# Patient Record
Sex: MALE | Race: WHITE | NOT HISPANIC OR LATINO | Employment: FULL TIME | URBAN - METROPOLITAN AREA
[De-identification: names, ages, dates, MRNs, and addresses within clinical notes are randomized per-mention and may not be internally consistent; named-entity substitution may affect disease eponyms.]

---

## 2021-09-10 ENCOUNTER — OFFICE VISIT (OUTPATIENT)
Dept: FAMILY MEDICINE | Facility: CLINIC | Age: 26
End: 2021-09-10
Payer: COMMERCIAL

## 2021-09-10 VITALS
TEMPERATURE: 97.6 F | BODY MASS INDEX: 22.54 KG/M2 | WEIGHT: 114.8 LBS | DIASTOLIC BLOOD PRESSURE: 67 MMHG | HEART RATE: 72 BPM | SYSTOLIC BLOOD PRESSURE: 103 MMHG | HEIGHT: 60 IN | RESPIRATION RATE: 16 BRPM | OXYGEN SATURATION: 98 %

## 2021-09-10 DIAGNOSIS — Z00.00 PREVENTATIVE HEALTH CARE: ICD-10-CM

## 2021-09-10 DIAGNOSIS — D50.9 IRON DEFICIENCY ANEMIA, UNSPECIFIED IRON DEFICIENCY ANEMIA TYPE: ICD-10-CM

## 2021-09-10 DIAGNOSIS — F64.0 GENDER DYSPHORIA IN ADULT: ICD-10-CM

## 2021-09-10 DIAGNOSIS — Z76.89 ENCOUNTER TO ESTABLISH CARE: Primary | ICD-10-CM

## 2021-09-10 PROBLEM — F90.9 ADHD: Status: ACTIVE | Noted: 2019-09-26

## 2021-09-10 PROBLEM — F33.9 MAJOR DEPRESSIVE DISORDER, RECURRENT (H): Status: ACTIVE | Noted: 2020-06-17

## 2021-09-10 PROBLEM — L30.9 ECZEMA: Status: ACTIVE | Noted: 2019-09-26

## 2021-09-10 LAB
ALBUMIN SERPL-MCNC: 3.8 G/DL (ref 3.4–5)
ALP SERPL-CCNC: 66 U/L (ref 40–150)
ALT SERPL W P-5'-P-CCNC: 35 U/L (ref 0–50)
AST SERPL W P-5'-P-CCNC: 20 U/L (ref 0–45)
BILIRUB DIRECT SERPL-MCNC: 0.2 MG/DL (ref 0–0.2)
BILIRUB SERPL-MCNC: 0.8 MG/DL (ref 0.2–1.3)
CHOLEST SERPL-MCNC: 144 MG/DL
FASTING STATUS PATIENT QL REPORTED: YES
FASTING STATUS PATIENT QL REPORTED: YES
FERRITIN SERPL-MCNC: 11 NG/ML (ref 12–150)
GLUCOSE BLD-MCNC: 83 MG/DL (ref 70–99)
HCV AB SERPL QL IA: NONREACTIVE
HDLC SERPL-MCNC: 60 MG/DL
HGB BLD-MCNC: 14.2 G/DL (ref 11.7–15.7)
IRON SATN MFR SERPL: 61 % (ref 15–46)
IRON SERPL-MCNC: 244 UG/DL (ref 35–180)
LDLC SERPL CALC-MCNC: 71 MG/DL
NONHDLC SERPL-MCNC: 84 MG/DL
PROT SERPL-MCNC: 7.6 G/DL (ref 6.8–8.8)
TIBC SERPL-MCNC: 401 UG/DL (ref 240–430)
TRANSFERRIN SERPL-MCNC: 292 MG/DL (ref 210–360)
TRIGL SERPL-MCNC: 67 MG/DL
VIT B12 SERPL-MCNC: 817 PG/ML (ref 193–986)

## 2021-09-10 PROCEDURE — 80061 LIPID PANEL: CPT | Performed by: STUDENT IN AN ORGANIZED HEALTH CARE EDUCATION/TRAINING PROGRAM

## 2021-09-10 PROCEDURE — 80076 HEPATIC FUNCTION PANEL: CPT | Performed by: STUDENT IN AN ORGANIZED HEALTH CARE EDUCATION/TRAINING PROGRAM

## 2021-09-10 PROCEDURE — 86803 HEPATITIS C AB TEST: CPT | Performed by: STUDENT IN AN ORGANIZED HEALTH CARE EDUCATION/TRAINING PROGRAM

## 2021-09-10 PROCEDURE — 85018 HEMOGLOBIN: CPT | Performed by: STUDENT IN AN ORGANIZED HEALTH CARE EDUCATION/TRAINING PROGRAM

## 2021-09-10 PROCEDURE — 84403 ASSAY OF TOTAL TESTOSTERONE: CPT | Performed by: STUDENT IN AN ORGANIZED HEALTH CARE EDUCATION/TRAINING PROGRAM

## 2021-09-10 PROCEDURE — 82607 VITAMIN B-12: CPT | Performed by: STUDENT IN AN ORGANIZED HEALTH CARE EDUCATION/TRAINING PROGRAM

## 2021-09-10 PROCEDURE — 82947 ASSAY GLUCOSE BLOOD QUANT: CPT | Performed by: STUDENT IN AN ORGANIZED HEALTH CARE EDUCATION/TRAINING PROGRAM

## 2021-09-10 PROCEDURE — 36415 COLL VENOUS BLD VENIPUNCTURE: CPT | Performed by: STUDENT IN AN ORGANIZED HEALTH CARE EDUCATION/TRAINING PROGRAM

## 2021-09-10 PROCEDURE — 99204 OFFICE O/P NEW MOD 45 MIN: CPT | Mod: GC | Performed by: STUDENT IN AN ORGANIZED HEALTH CARE EDUCATION/TRAINING PROGRAM

## 2021-09-10 PROCEDURE — 84466 ASSAY OF TRANSFERRIN: CPT | Performed by: STUDENT IN AN ORGANIZED HEALTH CARE EDUCATION/TRAINING PROGRAM

## 2021-09-10 PROCEDURE — 82728 ASSAY OF FERRITIN: CPT | Performed by: STUDENT IN AN ORGANIZED HEALTH CARE EDUCATION/TRAINING PROGRAM

## 2021-09-10 RX ORDER — CLOBETASOL PROPIONATE 0.5 MG/G
CREAM TOPICAL
COMMUNITY
Start: 2020-02-11 | End: 2022-05-13

## 2021-09-10 RX ORDER — TRIAMCINOLONE ACETONIDE 0.25 MG/G
OINTMENT TOPICAL
COMMUNITY
Start: 2019-11-19 | End: 2023-01-12

## 2021-09-10 RX ORDER — BUPROPION HYDROCHLORIDE 300 MG/1
300 TABLET ORAL
COMMUNITY
Start: 2020-08-20 | End: 2024-04-19

## 2021-09-10 RX ORDER — METHYLPHENIDATE HYDROCHLORIDE 10 MG/1
5 TABLET ORAL
COMMUNITY
Start: 2020-08-20 | End: 2023-01-12

## 2021-09-10 RX ORDER — METHYLPHENIDATE HYDROCHLORIDE 27 MG/1
27 TABLET, EXTENDED RELEASE ORAL
COMMUNITY
Start: 2020-08-20 | End: 2023-01-12

## 2021-09-10 ASSESSMENT — MIFFLIN-ST. JEOR: SCORE: 1195.98

## 2021-09-10 NOTE — PATIENT INSTRUCTIONS
Patient Education   Here is the plan from today's visit    1. Encounter to establish care  2. Preventative health care  3. Gender dysphoria in adult  Return in 1 week to begin transfer of testosterone therapy.     Eczema: Aquaphor or Eucerin cream daily, best time is right after shower. Any thick cream in a tub.     Acne: Will revisit next visit.  Bring what you're using and we'll go from there.     Please call or return to clinic if your symptoms don't go away.    Follow up plan  Return in about 1 week (around 9/17/2021) for Transition hormone care here.    Thank you for coming to [facility] today.  COVID-19 Vaccine:  [facility] has walk-in appointments for COVID-19 vaccines. No appointment needed!   You also have the option of receiving Moderna vaccine during your physician appointment. Please ask your care team for more information!  Lab Testing:  **If you had lab testing today and your results are reassuring or normal they will be mailed to you or sent through [employer] within 7 days.   **If the lab tests need quick action we will call you with the results.  **If you are having labs done on a different day, please call [phone] to schedule at [facility] or [phone] for other [facility] locations.   The phone number we will call with results is # [phone] (home) . If this is not the best number please call our clinic and change the number.  Medication Refills:  If you need any refills please call your pharmacy and they will contact us.   If you need to  your refill at a new pharmacy, please contact the new pharmacy directly. The new pharmacy will help you get your medications transferred faster.   Scheduling:  If you have any concerns about today's visit or wish to schedule another appointment please call our office during normal business hours [phone] (8-5:00 M-F)  If a referral was made to a [facility] and you don't get a call  from central scheduling please call [phone].  If a Mammogram was ordered for you at [facility] call [phone] to schedule or change your appointment.  If you had an EKG/XRay/CT/Ultrasound/MRI ordered the number is [phone] to schedule or change your radiology appointment.   Medical Concerns:  If you have urgent medical concerns please call [phone] at any time of the day.    Blossom Ghosh, DO

## 2021-09-10 NOTE — PROGRESS NOTES
"  Assessment & Plan     Encounter to establish care  Patient has been searching for an affirming clinic to centralize his care, and had heard good things about \[facility]\"".  We will plan on eventually transitioning all of his medications and care here.     Preventative health care  Hep C screening ordered, HIV screening updated.    Gender dysphoria in adult  Would like to transition his testosterone therapy to \[facility]\"".  Ordered liver function panel, glucose, hemoglobin, lipids, and total testosterone.  Patient will follow up with us in a week to transition that care here. Did not administer Sundays shot, so testosterone levels will likely be out of desired range.  Requested referral for top surgery, would like it sooner rather than later.   - Comprehensive Gender Care referral  - Gender Labs  - Return visit in 1 week    Iron Deficiency Anemia  Iron panel ordered today, encouraged patient to take iron supplement daily and use laxatives or stool softeners as needed.  When labs return, encouraged him to consider using cast iron pans, or a charanjit iron fish in his cooking.          Return in about 1 week (around 9/17/2021) for Transition hormone care here.    Blossom Ghosh [facility] is a 26 year old who presents to establish care at \[facility]\"". [place] about us as a trans clinic, wants to establish care and consolidate his cares.    Iron Deficiency: Diagnosed in 6th grade. \"Always tired.\" Takes iron occasionally. Not consistently. Iron tends to give him constipation, so he is looking for a gentler form. Current one is a slow release + vit D + stool softener. Take it every other day, working so far.     Eczema: Diagnosed in high school. Flares up occasionally. More when stressed. Prescription cream then, goes away after two weeks.     ADHD, Anxiety/Depression: Well controlled on current medications.     Sexual History: No current partners. Prefers any genders. Uses condoms and " "depo in the past. Thinking about the copper IUD. Progesterone only IUD may be an option. Both semesters in college after Depo, failed classes. May not be the best option.     Gender: Thinking about top surgery down the road, but hasn't started the process. Would like a referral. [facility]  Is happy on his current dosage, but does forget to take often.  Did not administer Sundays shot, so testosterone levels will likely be out of desired range.    Review of Systems   Headaches 1-2 times a week, takes ibuprofen and it helps. Stress induced, dehydration. Works outside (nature restoration).   Has tinnitus, sleeps with a fan on. Has trouble hearing people sometimes.       Constitutional, neuro, ENT, endocrine, pulmonary, cardiac, gastrointestinal, genitourinary, musculoskeletal, integument and psychiatric systems are negative, except as otherwise noted.          Objective    /67   Pulse 72   Temp 97.6  F (36.4  C) (Oral)   Resp 16   Ht 1.53 m (5' 0.24\")   Wt 52.1 kg (114 lb 12.8 oz)   LMP  (LMP Unknown)   SpO2 98%   BMI 22.24 kg/m    Body mass index is 22.24 kg/m .  Physical Exam   GENERAL: healthy, alert and no distress  EYES: Eyes grossly normal to inspection, PERRL and conjunctivae and sclerae normal  NECK: no adenopathy, no asymmetry, masses, or scars and thyroid normal to palpation  RESP: lungs clear to auscultation - no rales, rhonchi or wheezes  CV: regular rate and rhythm, normal S1 S2, no S3 or S4, no murmur, click or rub, no peripheral edema and peripheral pulses strong  ABDOMEN: soft, nontender, no hepatosplenomegaly, no masses and bowel sounds normal  MS: no gross musculoskeletal defects noted, no edema  SKIN: no suspicious lesions or rashes.  No current eczema flare, does have scattered mild acne on cheeks and nose.  PSYCH: mentation appears normal, affect normal/bright            "

## 2021-09-14 ENCOUNTER — TELEPHONE (OUTPATIENT)
Dept: FAMILY MEDICINE | Facility: CLINIC | Age: 26
End: 2021-09-14

## 2021-09-14 LAB — TESTOST SERPL-MCNC: 77 NG/DL (ref 8–60)

## 2021-09-14 NOTE — TELEPHONE ENCOUNTER
"RN left message with clinic callback number. If patient calls back, please transfer to any available RN.     Darlyn Sosa, RN    \"Duglas,  As you can see from your results, you were right! Your ferritin is low. I'd recommend continuing your iron supplement, ferrous sulfate 325 every day. As you know, this can cause some people some constipation, so be on the lookout for that and eat plenty of high fiber foods!     I'd also recommend starting Miralax every day, following the instructions on the bottle, then upping your amount until it works for you (within the bottle's range of course!)     Thanks,   Blossom Augie, DO  Pronouns: she/her/hers\"  "

## 2021-09-17 ENCOUNTER — OFFICE VISIT (OUTPATIENT)
Dept: FAMILY MEDICINE | Facility: CLINIC | Age: 26
End: 2021-09-17
Payer: COMMERCIAL

## 2021-09-17 VITALS
HEART RATE: 68 BPM | RESPIRATION RATE: 16 BRPM | WEIGHT: 113.4 LBS | SYSTOLIC BLOOD PRESSURE: 116 MMHG | BODY MASS INDEX: 21.97 KG/M2 | OXYGEN SATURATION: 99 % | DIASTOLIC BLOOD PRESSURE: 71 MMHG

## 2021-09-17 DIAGNOSIS — D50.9 IRON DEFICIENCY ANEMIA, UNSPECIFIED IRON DEFICIENCY ANEMIA TYPE: ICD-10-CM

## 2021-09-17 DIAGNOSIS — L70.9 ACNE, UNSPECIFIED ACNE TYPE: ICD-10-CM

## 2021-09-17 DIAGNOSIS — F64.0 GENDER DYSPHORIA IN ADULT: Primary | ICD-10-CM

## 2021-09-17 DIAGNOSIS — H91.93 DECREASED HEARING OF BOTH EARS: ICD-10-CM

## 2021-09-17 DIAGNOSIS — H93.13 TINNITUS, BILATERAL: ICD-10-CM

## 2021-09-17 PROCEDURE — 99214 OFFICE O/P EST MOD 30 MIN: CPT | Performed by: STUDENT IN AN ORGANIZED HEALTH CARE EDUCATION/TRAINING PROGRAM

## 2021-09-17 ASSESSMENT — PATIENT HEALTH QUESTIONNAIRE - PHQ9
5. POOR APPETITE OR OVEREATING: SEVERAL DAYS
SUM OF ALL RESPONSES TO PHQ QUESTIONS 1-9: 11

## 2021-09-17 ASSESSMENT — ANXIETY QUESTIONNAIRES
6. BECOMING EASILY ANNOYED OR IRRITABLE: MORE THAN HALF THE DAYS
3. WORRYING TOO MUCH ABOUT DIFFERENT THINGS: MORE THAN HALF THE DAYS
IF YOU CHECKED OFF ANY PROBLEMS ON THIS QUESTIONNAIRE, HOW DIFFICULT HAVE THESE PROBLEMS MADE IT FOR YOU TO DO YOUR WORK, TAKE CARE OF THINGS AT HOME, OR GET ALONG WITH OTHER PEOPLE: VERY DIFFICULT
1. FEELING NERVOUS, ANXIOUS, OR ON EDGE: NEARLY EVERY DAY
5. BEING SO RESTLESS THAT IT IS HARD TO SIT STILL: MORE THAN HALF THE DAYS
7. FEELING AFRAID AS IF SOMETHING AWFUL MIGHT HAPPEN: SEVERAL DAYS

## 2021-09-17 NOTE — PROGRESS NOTES
"    Assessment & Plan     Duglas was seen today for recheck medication.    Diagnoses and all orders for this visit:    Gender dysphoria in adult  -     needle, disp, 18G X 1\" MISC; Use to draw up hormones once weekly  -     syringe, disposable, 1 ML MISC; Use once weekly to draw up hormones  -     Needle, Disp, (B-D HYPODERMIC NEEDLE) 25G X 1\" MISC; Use to inject medication IM  Patient needing refills today of supplies.  Does not need medications at this time, but is transitioning hormone care to here.  Shot days are on Saturdays.    Iron deficiency anemia, unspecified iron deficiency anemia type  Reviewed labs, ferratin, transferrin, Vit B, CBC. Continue current plan    Acne, unspecified acne type  Discussed today's gentle skin care as well as provided recommendation for over-the-counter medications.  Patient will continue Differin and did not elect for higher strength benzyl peroxide today.  Will follow-up in a few weeks with PCP.    Decreased hearing of both ears  Tinnitus, bilateral  -     Adult Audiology Referral; Future  Chronic disease with acute exacerbation with unknown significance and prognosis.  Will refer to audiology for further work-up.    30 minutes spent on the date of the encounter doing chart review, history and exam, documentation and further activities per the note    Return in about 4 weeks (around 10/15/2021).    Rubén De Leon DO  [facility] RACHELLE Ardon is a 26 year old who presents for the following health issues     HPI     [facility]    Skin care- general acne. Always had a baseline of acne. Been around for some time. Using Differin. Does treat worst stuff, but not great for all. Has tried Neutrogena skin care. On bilateral cheeks. Occasional cysts.     HRT- Shot day is Sunday.     Tinnnitis- Years. At least 6. Both ears.          Objective    /71   Pulse 68   Resp 16   Wt 51.4 kg (113 lb 6.4 oz)   LMP  (LMP Unknown)   " SpO2 99%   BMI 21.97 kg/m    Body mass index is 21.97 kg/m .  Physical Exam   General: Alert and oriented, in no acute distress.  Skin: Scattered closed comedones across face  Eyes: Extra-ocular muscles grossly intact, pupils equal.  CV: No cyanosis or pallor, warm and well perfused.  Respiratory: No respiratory distress, no accessory muscle use.  Neuro: Gait and station normal, comprehension intact. Gross and fine motor skills intact.   Psychiatric: Mood and affect appear normal.   Extremities: Warm, able to move all four extremities at will.

## 2021-09-17 NOTE — PATIENT INSTRUCTIONS
Discussed acne management and self cares (see attached AVS for instructions given to patient about self cares). Educated about disease of pilosebaceous unit and how to prevent as well as treat.      -no picking/popping/face touching!  -Wash your pillowcases weekly  -Freshly cleaned towel daily   -Take a picture now - compare in 1 and 2 and 3 months.   -f/u 4-6 weeks    Medications:   Face wash, gentle, preferably with salicylic acid  Benzoyl peroxide 10%  Moisturizer with SPF 15-20  Retinoid gel -many options depending on insurance, Retin-A, adapdalene, etc.  Antibiotic gel - many options like duac, depending on insurance  Minocycline or Doxycycline: oral antibiotic pills, once or twice per day    Example face treatment schedule:  AM-   1-Wash hands first, then face with your salicylic acid wash or mild soap such as Dove, Cetaphil, or other, but must be without fragrance.  2-Pat dry with a clean towel, do not scrub/rub.  3-Use spot treatment of benzoyl peroxide for particularly bad lesions.  4-Apply moisturizer to entire face - noncomedogenic. Goal of 15-20 SPF. Other commonly used brands include Cetaphil or Cerave or Neutrogena.Make sure to protect your face from sun when skiing/out in snow or in summer sun.    PM-   1- Wash hands first, then face, pat dry.  2- Apply retinoid gel in a thin film over your whole face- this may make the acne appear worse and face more red - after a couple weeks it will begin to improve and then will begin to prevent further breakouts.  3- Apply duac (clindamycin antibiotic + benzoyl peroxide) to your whole face

## 2021-09-21 NOTE — TELEPHONE ENCOUNTER
Patient was seen by Dr. De Leon on 9/17/21- appears that labs were discussed but unclear if medication recommendations were. RN sending to provider to advise if additional follow up call needed.   Alma Andrew, RN

## 2021-09-21 NOTE — TELEPHONE ENCOUNTER
Per Dr. De Leon this was discussed at office visit. No further action needed at this time.   Alma Andrew, RN        Rubén De Leon, DO  You 9 minutes ago (10:55 AM)         I mentioned both and reiterated the plan from Dr. Ghosh.   Both are OTC meds, so if they need Rx would route to Dr. Ghosh.     Message text

## 2021-11-11 NOTE — PROGRESS NOTES
AUDIOLOGY REPORT    SUBJECTIVE:  Duglas Hurtado is a 26 year old adult who was seen in the Audiology Clinic at the [facility] for audiologic evaluation, referred by Rubén De Leon D.O.  The patient reports tinnitus for several years. Also reports sensitivity to loud sounds. Denies pain,drainage, dizziness and hearing loss. Has a family history of hearing loss iwht age.     OBJECTIVE:  Abuse Screening:  Do you feel unsafe at home or work/school? No  Do you feel threatened by someone? No  Does anyone try to keep you from having contact with others, or doing things outside of your home? No  Physical signs of abuse present? No     Fall Risk Screen:  1. Have you fallen two or more times in the past year? No  2. Have you fallen and had an injury in the past year? No    Otoscopic exam indicates ears are clear of cerumen bilaterally     Pure Tone Thresholds assessed using conventional audiometry with good  reliability from 250-8000 Hz bilaterally using insert earphones and circumaural headphones     RIGHT:  normal hearing sensitivity    LEFT:   Normal hearing sensativity    Tympanogram:    RIGHT: normal eardrum mobility    LEFT:   normal eardrum mobility    Reflexes (reported by stimulus ear):  RIGHT: Ipsilateral is present at normal levels  RIGHT: Contralateral is elevated  LEFT:   Ipsilateral is present at normal levels  LEFT:   Contralateral is present at normal levels      Speech Reception Threshold:    RIGHT: 5 dB HL    LEFT:   5 dB HL  Word Recognition Score:     RIGHT: 100% at 50 dB HL using NU-6 recorded word list.    LEFT:   100% at 50 dB HL using NU-6 recorded word list.      ASSESSMENT:  Hearing is within normal limits in both ears today. Today s results were discussed with the patient in detail.     PLAN:  Patient was counseled regarding tinnitus including reducing caffeine, alcohol, nicotine, and a low sodium diet as it apply's to the patient.It is recommended that  the patient return if changes occur.  Please call this clinic with questions regarding these results or recommendations.        Samra Car, [facility]  Licensed Audiologist  [place] #[id number]

## 2021-11-12 ENCOUNTER — OFFICE VISIT (OUTPATIENT)
Dept: AUDIOLOGY | Facility: CLINIC | Age: 26
End: 2021-11-12
Attending: STUDENT IN AN ORGANIZED HEALTH CARE EDUCATION/TRAINING PROGRAM
Payer: COMMERCIAL

## 2021-11-12 DIAGNOSIS — H91.93 DECREASED HEARING OF BOTH EARS: ICD-10-CM

## 2021-11-12 DIAGNOSIS — H93.13 TINNITUS, BILATERAL: ICD-10-CM

## 2021-11-12 PROCEDURE — 92550 TYMPANOMETRY & REFLEX THRESH: CPT | Performed by: AUDIOLOGIST

## 2021-11-12 PROCEDURE — 92557 COMPREHENSIVE HEARING TEST: CPT | Performed by: AUDIOLOGIST

## 2021-12-09 ENCOUNTER — TELEPHONE (OUTPATIENT)
Dept: FAMILY MEDICINE | Facility: CLINIC | Age: 26
End: 2021-12-09
Payer: COMMERCIAL

## 2021-12-09 NOTE — TELEPHONE ENCOUNTER
Prior Authorization Approval    Authorization Effective Date: 11/9/2021  Authorization Expiration Date: 12/9/2022  Medication: Testosterone -APPROVED  Approved Dose/Quantity:   Reference #:     Insurance Company: [employer] - Phone [phone] Fax [phone]  Expected CoPay:       CoPay Card Available:      Foundation Assistance Needed:    Which Pharmacy is filling the prescription (Not needed for infusion/clinic administered): [id number] - [place], [place] - [address] [place] [address]  Pharmacy Notified: Yes  Patient Notified: No    Pharmacy will notify patient when medication is ready.

## 2021-12-09 NOTE — TELEPHONE ENCOUNTER
Prior Authorization Retail Medication Request    Medication/Dose: Testosterone   ICD code Gender dysphoria in adult [F64.0]  - Primary   Insurance Name:  [employer]   Insurance ID:      [id number]

## 2021-12-09 NOTE — TELEPHONE ENCOUNTER
Central Prior Authorization Team   Phone: [phone]      PA Initiation    Medication: Testosterone -Initiated  Insurance Company: [employer] - Phone [phone] Fax [phone]  Pharmacy Filling the Rx: [id number] [address]  Filling Pharmacy Phone: [phone]  Filling Pharmacy Fax:    [place] Date: 12/9/2021

## 2021-12-18 ENCOUNTER — HEALTH MAINTENANCE LETTER (OUTPATIENT)
Age: 26
End: 2021-12-18

## 2022-01-31 DIAGNOSIS — F64.0 GENDER DYSPHORIA IN ADULT: ICD-10-CM

## 2022-01-31 RX ORDER — CETIRIZINE HYDROCHLORIDE 10 MG/1
TABLET ORAL
COMMUNITY
Start: 2022-01-05 | End: 2022-05-13

## 2022-01-31 RX ORDER — TESTOSTERONE CYPIONATE 200 MG/ML
60 INJECTION, SOLUTION INTRAMUSCULAR WEEKLY
Qty: 4 ML | Refills: 5 | Status: SHIPPED | OUTPATIENT
Start: 2022-01-31 | End: 2022-04-14

## 2022-01-31 RX ORDER — METHYLPHENIDATE HYDROCHLORIDE 18 MG/1
TABLET, EXTENDED RELEASE ORAL
COMMUNITY
Start: 2022-01-11 | End: 2023-10-27

## 2022-01-31 NOTE — TELEPHONE ENCOUNTER
"Request for medication refill:  testosterone cypionate (DEPOTESTOSTERONE) 200 MG/ML injection  Providers if patient needs an appointment and you are willing to give a one month supply please refill for one month and  send a letter/MyChart using \".SMILLIMITEDREFILL\" .smillimited and route chart to \"P SMI \" (Giving one month refill in non controlled medications is strongly recommended before denial)    If refill has been denied, meaning absolutely no refills without visit, please complete the smart phrase \".smirxrefuse\" and route it to the \"P SMI MED REFILLS\"  pool to inform the patient and the pharmacy.    Kayla Guido        "

## 2022-04-12 DIAGNOSIS — F64.0 GENDER DYSPHORIA IN ADULT: ICD-10-CM

## 2022-04-12 NOTE — TELEPHONE ENCOUNTER
"Request for medication refill:  Testosterone Cypionate    Providers if patient needs an appointment and you are willing to give a one month supply please refill for one month and  send a letter/MyChart using \".SMILLIMITEDREFILL\" .smillimited and route chart to \"P SMI \" (Giving one month refill in non controlled medications is strongly recommended before denial)    If refill has been denied, meaning absolutely no refills without visit, please complete the smart phrase \".smirxrefuse\" and route it to the \"P SMI MED REFILLS\"  pool to inform the patient and the pharmacy.    Nimisha Palmer        "

## 2022-04-12 NOTE — TELEPHONE ENCOUNTER
Patient last seen 9/17/21 by Dr. De Leon for gender care. Recommended to follow up in 4 weeks. Last HRT labs significant for total testosterone 77 (less than goal). Lipids, Hgb, glucose, and LFTs within normal limits. No follow ups currently scheduled. Recommending patient to be seen in clinic soon for follow up of HRT.

## 2022-04-14 DIAGNOSIS — F64.0 GENDER DYSPHORIA IN ADULT: ICD-10-CM

## 2022-04-14 RX ORDER — TESTOSTERONE CYPIONATE 200 MG/ML
60 INJECTION, SOLUTION INTRAMUSCULAR WEEKLY
Qty: 4 ML | Refills: 5 | Status: SHIPPED | OUTPATIENT
Start: 2022-04-14 | End: 2022-12-30

## 2022-05-12 NOTE — PROGRESS NOTES
"      Assessment & Plan     Patient is a 27-year-old individual on HRT who presents today for HRT follow-up, and also has multiple new concerns they wish to discuss today.    Gender dysphoria in adult  Regarding gender dysphoria, feels stable on current dose of testosterone.  Requires new needles.  Given instructions today on locations for intramuscular injections as patient has been injecting quite distally.  Laboratory testing is ordered today as well, to be completed between now and September of this year for annual labs.   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  For work-up, UA is completed today and returned without blood, which is reassuring.  No further work-up at this time.  - UA reflex to Microscopic and Culture    Acne vulgaris  Patient has concern for acne vulgaris plus or minus rosacea.  We were not able to discuss in great deal detail today, however, patient has been using over-the-counter adapalene.  A referral to dermatology is given given patient's multiple skin concerns today.  - Adult Dermatology Referral    Decreased ROM of neck  Incidentally, patient is noting some mild decreased range of motion when turning towards the left.  On exam, this is only mildly decreased.  Offered physical therapy as first-line treatment for this patient, and a referral was placed today.  - Physical Therapy Referral    Recurrent major depressive disorder, in partial remission (H)  Patient has a history of major depression.  PHQ-9 and DORA-7 are completed today and appear to be stable.         No follow-ups on file.    Selin Waddell MD  [facility]    Subjective   Chief Complaint   Patient presents with     Recheck Medication     HRT F/U       CRISTINO Ardon is a " "27 year old individual that uses pronouns He/Him/His/Himself that presents today for follow up of:  masculinizing hormone therapy.     Blood in urine on DOT exam    Acne  Adapalene   neutrogina moisturizer    On hormones?  YES +++ Shot day of the week? Sunday      Due for labs?  No      +++ Refills of meds needed?  No      DORA-7 SCORE 5/13/2022   Total Score 6       PHQ 9/17/2021 5/13/2022   PHQ-9 Total Score 11 10   Q9: Thoughts of better off dead/self-harm past 2 weeks Not at all Not at all     ---    Past Surgical History:   Procedure Laterality Date     STICHES/SUTURES CARE  2001    on forhead from falling [place] the [place]     ZZC DRAINAGE LYMPH NODE,SIMPLE  1998    Unsure of procedure, [facility]       Patient Active Problem List   Diagnosis     Iron deficiency anemia     Abnormal EEG     Vitamin D insufficiency     Abdominal pain, generalized     Other nonspecific finding on examination of urine     Major depressive disorder, recurrent (H)     Eczema     ADHD       Current Outpatient Medications   Medication Sig Dispense Refill     buPROPion (WELLBUTRIN XL) 300 MG 24 hr tablet Take 300 mg by mouth       CONCERTA 18 MG CR tablet TAKE 1 TABLET BY MOUTH ONE TIME DAILY       FERROUS SULFATE 325 (65 FE) MG PO TABS Take 1 tab twice daily (Patient taking differently: No sig reported) 120 Tab 3     methylphenidate (RITALIN) 10 MG tablet Take 5 mg by mouth       Needle, Disp, (B-D HYPODERMIC NEEDLE) 25G X 1\" MISC Use to inject medication IM 25 each 3     needle, disp, 18G X 1\" MISC Use to draw up hormones once weekly 25 each 3     syringe, disposable, 1 ML MISC Use once weekly to draw up hormones 25 each 3     testosterone cypionate (DEPOTESTOSTERONE) 200 MG/ML injection Inject 0.3 mLs (60 mg) into the muscle once a week 4 mL 5     Methylphenidate HCl (METHYLPHENIDATE ER) 27 MG 24H tablet Take 27 mg by mouth (Patient not taking: Reported on 5/13/2022)       needle, disp, 18G X 1\" MISC Use to draw up " "hormones once weekly (Patient not taking: Reported on 5/13/2022) 25 each 3     Needle, Disp, 25G X 1-1/2\" MISC Use once weekly for administering hormone IM (Patient not taking: Reported on 5/13/2022) 25 each 3     syringe, disposable, 1 ML MISC Use once weekly to draw up hormones (Patient not taking: Reported on 5/13/2022) 25 each 3     triamcinolone (KENALOG) 0.025 % external ointment  (Patient not taking: Reported on 5/13/2022)       VITAMIN D, CHOLECALCIFEROL, PO Take 1,000 Units by mouth daily (Patient not taking: Reported on 5/13/2022)         History   Smoking Status     Passive Smoke Exposure - Never Smoker   Smokeless Tobacco     Never Used     Comment: mom smokes         Review of Systems   Constitutional, HEENT, cardiovascular, pulmonary, gi and gu systems are negative, except as otherwise noted.      Objective    /77   Pulse 110   Temp 98.2  F (36.8  C) (Oral)   Resp 16   Wt 54 kg (119 lb)   SpO2 96%   Breastfeeding No   BMI 23.06 kg/m    Body mass index is 23.06 kg/m .  Physical Exam  Constitutional:       Appearance: Normal appearance.   HENT:      Head: Normocephalic.   Eyes:      General: No scleral icterus.     Extraocular Movements: Extraocular movements intact.      Conjunctiva/sclera: Conjunctivae normal.   Neck:      Comments: Minimal decreased ROM when turning to the left.   Cardiovascular:      Rate and Rhythm: Normal rate and regular rhythm.      Heart sounds: Normal heart sounds.   Pulmonary:      Effort: Pulmonary effort is normal.      Breath sounds: Normal breath sounds.   Musculoskeletal:         General: Normal range of motion.   Neurological:      General: No focal deficit present.      Mental Status: He is alert and oriented to person, place, and time.           Results from this visit  Results for orders placed or performed in visit on 05/13/22   UA reflex to Microscopic and Culture     Status: Normal    Specimen: Urine, Midstream   Result Value Ref Range    Color Urine " Yellow Colorless, Straw, Light Yellow, Yellow    Appearance Urine Clear Clear    Glucose Urine Negative Negative mg/dL    Bilirubin Urine Negative Negative    Ketones Urine Negative Negative mg/dL    Specific Gravity Urine 1.020 1.005 - 1.030    Blood Urine Negative Negative    pH Urine 6.5 5.0 - 8.0    Protein Albumin Urine Negative Negative mg/dL    Urobilinogen Urine 0.2 0.2, 1.0 E.U./dL    Nitrite Urine Negative Negative    Leukocyte Esterase Urine Negative Negative    Narrative    Microscopic not indicated       This office note has been dictated.

## 2022-05-13 ENCOUNTER — OFFICE VISIT (OUTPATIENT)
Dept: FAMILY MEDICINE | Facility: CLINIC | Age: 27
End: 2022-05-13
Payer: COMMERCIAL

## 2022-05-13 VITALS
TEMPERATURE: 98.2 F | BODY MASS INDEX: 23.06 KG/M2 | DIASTOLIC BLOOD PRESSURE: 77 MMHG | HEART RATE: 110 BPM | SYSTOLIC BLOOD PRESSURE: 110 MMHG | WEIGHT: 119 LBS | OXYGEN SATURATION: 96 % | RESPIRATION RATE: 16 BRPM

## 2022-05-13 DIAGNOSIS — R29.898 DECREASED ROM OF NECK: ICD-10-CM

## 2022-05-13 DIAGNOSIS — R31.9 HEMATURIA, UNSPECIFIED TYPE: ICD-10-CM

## 2022-05-13 DIAGNOSIS — F64.0 GENDER DYSPHORIA IN ADULT: Primary | ICD-10-CM

## 2022-05-13 DIAGNOSIS — L70.0 ACNE VULGARIS: ICD-10-CM

## 2022-05-13 DIAGNOSIS — F33.41 RECURRENT MAJOR DEPRESSIVE DISORDER, IN PARTIAL REMISSION (H): ICD-10-CM

## 2022-05-13 LAB
ALBUMIN UR-MCNC: NEGATIVE MG/DL
APPEARANCE UR: CLEAR
BILIRUB UR QL STRIP: NEGATIVE
COLOR UR AUTO: YELLOW
GLUCOSE UR STRIP-MCNC: NEGATIVE MG/DL
HGB UR QL STRIP: NEGATIVE
KETONES UR STRIP-MCNC: NEGATIVE MG/DL
LEUKOCYTE ESTERASE UR QL STRIP: NEGATIVE
NITRATE UR QL: NEGATIVE
PH UR STRIP: 6.5 [PH] (ref 5–8)
SP GR UR STRIP: 1.02 (ref 1–1.03)
UROBILINOGEN UR STRIP-ACNC: 0.2 E.U./DL

## 2022-05-13 PROCEDURE — 99214 OFFICE O/P EST MOD 30 MIN: CPT | Mod: GC | Performed by: STUDENT IN AN ORGANIZED HEALTH CARE EDUCATION/TRAINING PROGRAM

## 2022-05-13 PROCEDURE — 81003 URINALYSIS AUTO W/O SCOPE: CPT | Performed by: STUDENT IN AN ORGANIZED HEALTH CARE EDUCATION/TRAINING PROGRAM

## 2022-05-13 ASSESSMENT — ANXIETY QUESTIONNAIRES
7. FEELING AFRAID AS IF SOMETHING AWFUL MIGHT HAPPEN: SEVERAL DAYS
IF YOU CHECKED OFF ANY PROBLEMS ON THIS QUESTIONNAIRE, HOW DIFFICULT HAVE THESE PROBLEMS MADE IT FOR YOU TO DO YOUR WORK, TAKE CARE OF THINGS AT HOME, OR GET ALONG WITH OTHER PEOPLE: SOMEWHAT DIFFICULT
1. FEELING NERVOUS, ANXIOUS, OR ON EDGE: MORE THAN HALF THE DAYS
2. NOT BEING ABLE TO STOP OR CONTROL WORRYING: SEVERAL DAYS
GAD7 TOTAL SCORE: 6
6. BECOMING EASILY ANNOYED OR IRRITABLE: NOT AT ALL
3. WORRYING TOO MUCH ABOUT DIFFERENT THINGS: SEVERAL DAYS
5. BEING SO RESTLESS THAT IT IS HARD TO SIT STILL: NOT AT ALL

## 2022-05-13 ASSESSMENT — PATIENT HEALTH QUESTIONNAIRE - PHQ9
SUM OF ALL RESPONSES TO PHQ QUESTIONS 1-9: 10
5. POOR APPETITE OR OVEREATING: SEVERAL DAYS

## 2022-05-13 NOTE — RESULT ENCOUNTER NOTE
Dear Duglas  Here are your labs. There was no blood seen today. No further follow up needed on this issue unless you start to notice new urinary symptoms or see blood yourself.     Selin Waddell MD

## 2022-05-13 NOTE — PATIENT INSTRUCTIONS
"Patient Education   Here is the plan from today's visit    1. Gender dysphoria in adult  - Needle, Disp, (B-D HYPODERMIC NEEDLE) 25G X 1\" MISC; Use to inject medication IM  Dispense: 25 each; Refill: 3  - CBC with platelets; Future  - Comprehensive metabolic panel; Future  - Testosterone total; Future  - Lipid Profile; Future    2. Hematuria, unspecified type  - UA reflex to Microscopic and Culture; Future    3. Acne vulgaris  - Adult Dermatology Referral; Future    4. Decreased ROM of neck  - Physical Therapy Referral; Future    5. Recurrent major depressive disorder, in partial remission (H)      Please call or return to clinic if your symptoms don't go away.    Follow up plan  No follow-ups on file.    Thank you for coming to [facility] today.  Lab Testing:  **If you had lab testing today and your results are reassuring or normal they will be mailed to you or sent through [employer] within 7 days.   **If the lab tests need quick action we will call you with the results.  **If you are having labs done on a different day, please call [phone] to schedule at [facility] or [phone] for other [facility] locations. Labs do not offer walk-in appointments.  The phone number we will call with results is # [phone] (home) . If this is not the best number please call our clinic and change the number.  Medication Refills:  If you need any refills please call your pharmacy and they will contact us.   If you need to  your refill at a new pharmacy, please contact the new pharmacy directly. The new pharmacy will help you get your medications transferred faster.   Scheduling:  If you have any concerns about today's visit or wish to schedule another appointment please call our office during normal business hours [phone] (8-5:00 M-F)  If a referral was made to an [facility] specialty provider and you do not get a call from central scheduling, please refer to directions on your " visit summary or call our office during normal business hours for assistance.   If a Mammogram was ordered for you at the Breast Center call [phone] to schedule or change your appointment.  If you had an XRay/CT/Ultrasound/MRI ordered the number is [phone] to schedule or change your radiology appointment.   [facility] has limited ultrasound appointments available on Wednesdays, if you would like your ultrasound at [facility], please call [phone] to schedule.   Medical Concerns:  If you have urgent medical concerns please call [phone] at any time of the day.    Selin Waddell MD       Self-Injection Made Easy   Patient Education Information Sheet  [facility]    You Will Be Able To:  1. Self inject your medication accurately  2. Receive the injection in your own home    How Do I Get My Medication?  We will enter a prescription for your medication and supplies. They can be picked up at the pharmacy or mailed to your home by the pharmacy. It may come in a 1 ml or 10ml size.  1ml=1cc  10ml is 2 teaspoons    How Do I Store My Medication?  Testosterone and estrogen can be stored at room temperature.  USP (United [place] Pharmacopeia) recommends you dispose of the 10 mL vial after 28 days. 1mL vials are labeled as single use. Many patients keep vials for longer, but how long the medicine stays effective after exposure to air and light is uncertain. Definitely if it's many months old and has been punctured, discard it and get a new vial. A vial that is unused is stable for at least a year.     Special Note for Testosterone and Estrogen Injections:   Hormones are in an oil which is thick & can be hard to draw into the syringe.   You will receive two needles for each hormone injection.   One needle is larger and used to draw the medication into the syringe (18g)   The other is thinner and used for the injection (22 or 23g)    Equipment Needed  1. Medication Vial  2.  Syringe  3. Two Needles  4. Alcohol swab or Alcohol and cotton balls  5. Band-aid if needed  6. Sharps Disposal - can use laundry detergent tub      Parts of a Syringe      Intramuscular Injection (IM) overview  The needle passes through the skin and subcutaneous fatty tissue then medication is injected directly into the muscle.     Push the syringe straight in at a 90 degree angle to your skin, all the way to the hub.   Then depress the plunger, waiting a few seconds for the medication to enter the muscle.   Then remove the syringe and dispose of it.      Site for IM Injection (Thigh)        Site for IM Injection (Hip)            Steps for Intramuscular Injection  Gather Supplies (Double check for correct medication and dosage).  Wash hands  Choose a site for the injection. Sites include: thigh or buttock  Draw up medication into syringe (don t forget to push out excess air with plunger). You will have two needles. Use the larger needle to draw up the medicine. Then, change the needle to the thinner one for the injection. Carefully recap needle and set syringe aside.  Wipe injection site with alcohol swab  Relax the muscle. Pinch a piece of skin and tissue to inject into  Dart the needle through the skin at a 90 degree angle  Take hold of syringe with free hand to stabilize  Carefully pull back on the plunger slightly (aspirate) and check for blood in the syringe  If there is no blood, push plunger to inject medication  If there is blood, remove the needle, throw away and start over  After injecting the medication, remove the needle and place in sharps container  Use a band-aid to cover the site if desired    Intramuscular Injection Safety Tips  1. Double check for correct medication and dosage  2. Dart the needle in quickly and to the hub  3. Remember to aspirate (remove needle if you see blood, discard and start over)  4. Dispose of  used needle and syringe right away in proper sharps container      You Can Do  It!  Once you learn the skills, you will be a pro in no time!  If you are having trouble, consider coming to clinic, or going to the shot clinic at [facility], [address] [facility], [phone]  [email or web address]    Images from: [email or web address]

## 2022-05-14 ASSESSMENT — ANXIETY QUESTIONNAIRES: GAD7 TOTAL SCORE: 6

## 2022-06-02 ENCOUNTER — HOSPITAL ENCOUNTER (EMERGENCY)
Facility: CLINIC | Age: 27
Discharge: HOME | End: 2022-06-02
Attending: EMERGENCY MEDICINE | Admitting: EMERGENCY MEDICINE
Payer: OTHER MISCELLANEOUS

## 2022-06-02 VITALS
DIASTOLIC BLOOD PRESSURE: 73 MMHG | HEART RATE: 118 BPM | SYSTOLIC BLOOD PRESSURE: 114 MMHG | TEMPERATURE: 98.9 F | WEIGHT: 115 LBS | OXYGEN SATURATION: 98 % | HEIGHT: 60 IN | BODY MASS INDEX: 22.58 KG/M2 | RESPIRATION RATE: 16 BRPM

## 2022-06-02 DIAGNOSIS — A69.20 ERYTHEMA MIGRANS (LYME DISEASE): ICD-10-CM

## 2022-06-02 PROCEDURE — 99283 EMERGENCY DEPT VISIT LOW MDM: CPT

## 2022-06-02 PROCEDURE — 250N000013 HC RX MED GY IP 250 OP 250 PS 637: Performed by: EMERGENCY MEDICINE

## 2022-06-02 RX ORDER — DOXYCYCLINE 100 MG/1
100 CAPSULE ORAL 2 TIMES DAILY
Qty: 28 CAPSULE | Refills: 0 | Status: SHIPPED | OUTPATIENT
Start: 2022-06-02 | End: 2022-06-16

## 2022-06-02 RX ORDER — DOXYCYCLINE 100 MG/1
100 CAPSULE ORAL ONCE
Status: COMPLETED | OUTPATIENT
Start: 2022-06-02 | End: 2022-06-02

## 2022-06-02 RX ADMIN — DOXYCYCLINE HYCLATE 100 MG: 100 CAPSULE ORAL at 22:39

## 2022-06-02 NOTE — LETTER
June 5, 2022      To Whom It May Concern:      Duglas Hurtado was seen in our Emergency Department today, 06/04/22.  I expect his condition to improve over the next 3 days.  He may return to work on 06/08/22.    Sincerely,        Cirilo Greenfield RN

## 2022-06-03 NOTE — ED TRIAGE NOTES
Bullseye rash noted to left shoulder two hours ago. Works in [employer] for past three days - unsure when tick bite occurred. Reports feeling feverish, nausea and tired. Took ibuprofen at 1600     Triage Assessment     Row Name 06/02/22 2034       Triage Assessment (Adult)    Airway WDL WDL       Respiratory WDL    Respiratory WDL WDL       Cardiac WDL    Cardiac WDL WDL       Cognitive/Neuro/Behavioral WDL    Cognitive/Neuro/Behavioral WDL WDL

## 2022-06-03 NOTE — ED PROVIDER NOTES
"  History   Chief Complaint:  Rash     HPI   Duglas Hurtado is a 27 year old adult otherwise healthy who presents alone for evaluation of a tick bite. The patient reports that that they were on a camping trick for work at [employer] and was bitten by a tick in the last 3 days.  The patient believes it was a tick that was pulled off today and is now having a bullseye rash on the left arm.    Review of Systems   Skin: Positive for rash (Bullseye rash left shoulder).   All other systems reviewed and are negative.    Allergies:  The patient has no known allergies.     Medications:  Wellbutrin XL  Concerta  Ritalin  Methylphenidate ER  Depotestosterone    Past Medical History:     ADHD  Breast abscess  Depression  Gender identity disorder in children  Iron deficiency anemia  trans gendered      Past Surgical History:    Drainage lymph node, simple    Social History:  The patient presents to the ED alone.  The patient works for [employer] [place].    Physical Exam     Patient Vitals for the past 24 hrs:   BP Temp Temp src Pulse Resp SpO2 Height Weight   06/02/22 2036 -- 98.9  F (37.2  C) Oral -- -- -- -- --   06/02/22 2033 114/73 97.8  F (36.6  C) Temporal 110 16 98 % 1.499 m (4' 11\") 52.2 kg (115 lb)       Physical Exam  Physical Exam   General:  Sitting on bed with nobody at bedside, comfortable appearing.   HENT:  No obvious trauma to head  Right Ear:  External ear normal.   Left Ear:  External ear normal.   Nose:  Nose normal.   Eyes:  Conjunctivae and EOM are normal.  Neck: Normal range of motion. Neck supple. No tracheal deviation present.   Pulm/Chest: No respiratory distress  M/S: Normal range of motion.   Neuro: Alert. GCS 15.  Skin: Bullseye rash on left back about 15 cm in total outside diameter. No tick present. Skin is warm and dry. Not diaphoretic.   Psych: Normal mood and affect. Behavior is normal.     Emergency Department Course   Emergency Department Course:  Reviewed:  I " reviewed nursing notes, vitals, past medical history and Care Everywhere    Assessments:  2205 I obtained history and examined the patient as noted above. Plan explained at this time.     Interventions:  Vibramycin, 100 mg, PO    Disposition:  The patient was discharged to home.     Impression & Plan   Medical Decision Making:  Duglas Hurtado is a very pleasant 27 year old adult who presents today after a tick bite as detailed above. The patient does have an area of inflammation and raised erythema surrounding the bite, consistent with erythema migrans. There is no evidence of cellulitis. Patient already removed the tick. Signs or symptoms of lyme disease, which were reviewed with the patient. The patient will be started on a course of Doxycyline empirically for Lyme's disease given classic rash.  At this point, there is no sign of other acute abnormality on exam and she is safe for discharge. The patient will return to the ER if any  increasing erythema, pain, fevers, or for other concerns.     The treatment plan was discussed with the patient and they expressed understanding of this plan and consented to the plan.  In addition, the patient will return to the emergency department if their symptoms persist, worsen, if new symptoms arise or if there is any concern as other pathology may be present that is not evident at this time. They also understand the importance of close follow up in the clinic and if unable to do so will return to the emergency department for a reevaluation. All questions were answered.    Diagnosis:    ICD-10-CM    1. Erythema migrans (Lyme disease)  A69.20        Discharge Medications:  New Prescriptions    No medications on file       Scribe Disclosure:  SCOTT, Stefan Herman, am serving as a scribe at 10:02 PM on 6/2/2022 to document services personally performed by José Miguel Foss DO based on my observations and the provider's statements to me.          Jos éMiguel Foss,  DO  06/02/22 2227

## 2022-10-09 ENCOUNTER — HEALTH MAINTENANCE LETTER (OUTPATIENT)
Age: 27
End: 2022-10-09

## 2022-11-23 ENCOUNTER — OFFICE VISIT (OUTPATIENT)
Dept: DERMATOLOGY | Facility: CLINIC | Age: 27
End: 2022-11-23
Attending: FAMILY MEDICINE
Payer: COMMERCIAL

## 2022-11-23 DIAGNOSIS — L70.0 ACNE VULGARIS: ICD-10-CM

## 2022-11-23 DIAGNOSIS — L71.9 ROSACEA: Primary | ICD-10-CM

## 2022-11-23 DIAGNOSIS — L30.1 DYSHIDROTIC ECZEMA: ICD-10-CM

## 2022-11-23 PROCEDURE — 99204 OFFICE O/P NEW MOD 45 MIN: CPT | Mod: GC | Performed by: DERMATOLOGY

## 2022-11-23 RX ORDER — CLOBETASOL PROPIONATE 0.5 MG/G
OINTMENT TOPICAL 2 TIMES DAILY
Qty: 45 G | Refills: 1 | Status: SHIPPED | OUTPATIENT
Start: 2022-11-23 | End: 2023-10-27

## 2022-11-23 RX ORDER — METRONIDAZOLE 7.5 MG/G
GEL TOPICAL 2 TIMES DAILY
Qty: 45 G | Refills: 3 | Status: SHIPPED | OUTPATIENT
Start: 2022-11-23

## 2022-11-23 RX ORDER — TRETINOIN 0.5 MG/G
CREAM TOPICAL AT BEDTIME
Qty: 45 G | Refills: 3 | Status: SHIPPED | OUTPATIENT
Start: 2022-11-23 | End: 2023-03-15

## 2022-11-23 ASSESSMENT — PAIN SCALES - GENERAL: PAINLEVEL: NO PAIN (0)

## 2022-11-23 NOTE — PROGRESS NOTES
[facility] Dermatology Note  Encounter Date: Nov 23, 2022  Office Visit     Dermatology Problem List:  1. Acne. Prior: Adapalene.   - Tretinoin 0.05% cream, BPO 5% wash   2. Eczema. Clobetasol 0.05% cream BID prn + generous use of vaseline.   3. Rosacea. Metronidazole 1% gel BID. Future: PDL laser   ____________________________________________    Assessment & Plan:     # Acne, face > upper chest.   - Discontinue adapalene   - Start tretinoin 0.05% cream; apply nightly to full face, followed by moisturizer   - Start BPO 5% wash in the showers for body and in the morning as facial cleanser     # Rosacea.   - Metronidazole 1% 2 times daily on affected areas on face   - Cosmetic consult for PDL for nose x 1-3 lesions (vs full face if interested)    # Dyshidrotic eczema.   - Start clobetasol 0.05% ointment 2 times daily to affected area until resolution of rash   - Continue generous applications of vaseline     Procedures Performed:   None    Follow-up: 3 month(s) or earlier for new or changing lesions    Staff and Resident:     Alejandra Trinidad MD PGY4    I, Darcy Liriano MD, saw this patient with the resident and agree with the resident s findings and plan of care as documented in the resident s note.    ____________________________________________    CC: Acne and Derm Problem (Duglas presents for acne, rosacea, and eczema)    HPI:  Mr. Duglas Hurtado is a(n) 27 year old adult who presents today as a new patient for evaluation of acne, as well as discussion of rosacea and eczema. Acne is located primarily on the face, today he would describe is an 'okay / good' day for acne. He currently is using cera-ve hydrating facial cleanser, neutrogenia moisturizer, and adapalene gel. Has never used oral antibiotics or any oral agents for treatment of acne. Reports acne comes and goes, but always a baseline. He also describes a history of eczema in the past, as well as more recent episodes of dyshidrotic  "eczema, with scaliness of the pinky currently. Lastly, he is interested in discussing treatment of rosacea and may be interested in learning more about cost for laser treatments, primarily for the concentrated vessels on the nose. Patient is otherwise feeling well, without additional skin concerns.    Labs Reviewed:  N/A    Physical Exam:  Vitals: There were no vitals taken for this visit.  SKIN: Waist-up skin, which includes the head/face, neck, upper chest, upper back, and bilateral hands was examined.  - There are superifical acneiform papules with intermixed open and closed comedones on the bilateral cheeks.   There is a thin scaly plaque on the left 5th digit  There are thin erythematous scattered telangectasias on the bilateral cheeks as well as clustered telangiectasias x 3 on the nose   - No other lesions of concern on areas examined.     Medications:  Current Outpatient Medications   Medication     buPROPion (WELLBUTRIN XL) 300 MG 24 hr tablet     CONCERTA 18 MG CR tablet     FERROUS SULFATE 325 (65 FE) MG PO TABS     Needle, Disp, (B-D HYPODERMIC NEEDLE) 25G X 1\" MISC     needle, disp, 18G X 1\" MISC     Needle, Disp, 25G X 1-1/2\" MISC     syringe, disposable, 1 ML MISC     syringe, disposable, 1 ML MISC     testosterone cypionate (DEPOTESTOSTERONE) 200 MG/ML injection     methylphenidate (RITALIN) 10 MG tablet     Methylphenidate HCl (METHYLPHENIDATE ER) 27 MG 24H tablet     triamcinolone (KENALOG) 0.025 % external ointment     VITAMIN D, CHOLECALCIFEROL, PO     No current facility-administered medications for this visit.      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

## 2022-11-23 NOTE — PATIENT INSTRUCTIONS
# Acne   - switch adaplene to tretinoin 0.05% cream; apply nightly to full face, followed by moisturizer   - start BPO 5% wash in the showers for body and in the morning as facial cleanser     # Rosacea.   - metronidazole 1% 2 times daily on affected areas on face   - cosmetic consult for PDL for nose x 1-3 lesions (vs full face if interested)    # Dyshidrotic eczema.   - start clobetasol 0.05% ointment 2 times daily to affected area until resolution of rash   - continue generous applications of vaseline

## 2022-11-23 NOTE — LETTER
11/23/2022       RE: Duglas Hurtado  [address]  [facility] [address]     Dear Colleague,    Thank you for referring your patient, Duglas Hurtado, to the [facility] [place] at [facility]. Please see a copy of my visit note below.    [facility] Dermatology Note  Encounter Date: Nov 23, 2022  Office Visit     Dermatology Problem List:  1. Acne. Prior: Adapalene.   - Tretinoin 0.05% cream, BPO 5% wash   2. Eczema. Clobetasol 0.05% cream BID prn + generous use of vaseline.   3. Rosacea. Metronidazole 1% gel BID. Future: PDL laser   ____________________________________________    Assessment & Plan:     # Acne, face > upper chest.   - Discontinue adapalene   - Start tretinoin 0.05% cream; apply nightly to full face, followed by moisturizer   - Start BPO 5% wash in the showers for body and in the morning as facial cleanser     # Rosacea.   - Metronidazole 1% 2 times daily on affected areas on face   - Cosmetic consult for PDL for nose x 1-3 lesions (vs full face if interested)    # Dyshidrotic eczema.   - Start clobetasol 0.05% ointment 2 times daily to affected area until resolution of rash   - Continue generous applications of vaseline     Procedures Performed:   None    Follow-up: 3 month(s) or earlier for new or changing lesions    Staff and Resident:     Alejandra Trinidad MD PGY4    I, Darcy Liriano MD, saw this patient with the resident and agree with the resident s findings and plan of care as documented in the resident s note.    ____________________________________________    CC: Acne and Derm Problem (Duglas presents for acne, rosacea, and eczema)    HPI:  Mr. Duglas Hurtado is a(n) 27 year old adult who presents today as a new patient for evaluation of acne, as well as discussion of rosacea and eczema. Acne is located primarily on the face, today he would describe is an 'okay / good' day for acne. He currently is  "using cera-ve hydrating facial cleanser, neutrogenia moisturizer, and adapalene gel. Has never used oral antibiotics or any oral agents for treatment of acne. Reports acne comes and goes, but always a baseline. He also describes a history of eczema in the past, as well as more recent episodes of dyshidrotic eczema, with scaliness of the pinky currently. Lastly, he is interested in discussing treatment of rosacea and may be interested in learning more about cost for laser treatments, primarily for the concentrated vessels on the nose. Patient is otherwise feeling well, without additional skin concerns.    Labs Reviewed:  N/A    Physical Exam:  Vitals: There were no vitals taken for this visit.  SKIN: Waist-up skin, which includes the head/face, neck, upper chest, upper back, and bilateral hands was examined.  - There are superifical acneiform papules with intermixed open and closed comedones on the bilateral cheeks.   There is a thin scaly plaque on the left 5th digit  There are thin erythematous scattered telangectasias on the bilateral cheeks as well as clustered telangiectasias x 3 on the nose   - No other lesions of concern on areas examined.     Medications:  Current Outpatient Medications   Medication     buPROPion (WELLBUTRIN XL) 300 MG 24 hr tablet     CONCERTA 18 MG CR tablet     FERROUS SULFATE 325 (65 FE) MG PO TABS     Needle, Disp, (B-D HYPODERMIC NEEDLE) 25G X 1\" MISC     needle, disp, 18G X 1\" MISC     Needle, Disp, 25G X 1-1/2\" MISC     syringe, disposable, 1 ML MISC     syringe, disposable, 1 ML MISC     testosterone cypionate (DEPOTESTOSTERONE) 200 MG/ML injection     methylphenidate (RITALIN) 10 MG tablet     Methylphenidate HCl (METHYLPHENIDATE ER) 27 MG 24H tablet     triamcinolone (KENALOG) 0.025 % external ointment     VITAMIN D, CHOLECALCIFEROL, PO     No current facility-administered medications for this visit.      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

## 2022-11-23 NOTE — NURSING NOTE
Dermatology Rooming Note    Duglas Hurtado's goals for this visit include:   Chief Complaint   Patient presents with     Acne     Derm Problem     Duglas presents for acne, rosacea, and eczema     Marla Arvizu LPN

## 2022-12-30 DIAGNOSIS — F64.0 GENDER DYSPHORIA IN ADULT: ICD-10-CM

## 2022-12-30 RX ORDER — TESTOSTERONE CYPIONATE 200 MG/ML
60 INJECTION, SOLUTION INTRAMUSCULAR WEEKLY
Qty: 4 ML | Refills: 0 | Status: SHIPPED | OUTPATIENT
Start: 2022-12-30 | End: 2023-10-27

## 2022-12-30 NOTE — TELEPHONE ENCOUNTER
"[facility] phone call message- patient requesting a refill:    Full Medication Name: testosterone cypionate (DEPOTESTOSTERONE) 200 MG/ML injection    needle, disp, 18G X 1\" MISC    Needle, Disp, 25G X 1-1/2\" MISC    syringe, disposable, 1 ML MISC    Dose: See chart    Pharmacy confirmed as     [facility] -- [place], [place] - [address]  [address]  [facility] [address]  Phone: [phone] Fax: [phone]  : Yes    Additional Comments: Patient called to request a refill of medication. Patient is coming in for a med f/u on 01/12 and will need a bridge to last until that appointment.     OK to leave a message on voice mail? Yes    Primary language: English      needed? No    Call taken on December 30, 2022 at 1:55 PM by Shy Diane    "

## 2022-12-30 NOTE — TELEPHONE ENCOUNTER
"Last seen 5/13/22 with Dr. Waddell    Request for medication refill:  needle, disp, 18G X 1\" MISC  Needle, Disp, 25G X 1-1/2\" MISC  syringe, disposable, 1 ML MISC  testosterone cypionate (DEPOTESTOSTERONE) 200 MG/ML injection  Providers if patient needs an appointment and you are willing to give a one month supply please refill for one month and  send a letter/MyChart using \".SMILLIMITEDREFILL\" .smillimited and route chart to \"P SMI \" (Giving one month refill in non controlled medications is strongly recommended before denial)    If refill has been denied, meaning absolutely no refills without visit, please complete the smart phrase \".smirxrefuse\" and route it to the \"P SMI MED REFILLS\"  pool to inform the patient and the pharmacy.    Natalie Guerrero RN        "

## 2023-01-02 ENCOUNTER — TELEPHONE (OUTPATIENT)
Dept: FAMILY MEDICINE | Facility: CLINIC | Age: 28
End: 2023-01-02

## 2023-01-02 NOTE — TELEPHONE ENCOUNTER
Prior Authorization Specialty Medication Request    Medication/Dose: testosterone cypionate (DEPOTESTOSTERONE) 200 MG/ML injection  ICD code (if different than what is on RX):    Previously Tried and Failed:      Important Lab Values:   Rationale:     Insurance Name: [employer]  Insurance ID:[id number]   Insurance Phone Number:     Pharmacy Information (if different than what is on RX)  Name:  [facility] -- [place], [place] - [address] [facility]  Phone:  [phone]

## 2023-01-04 RX ORDER — TESTOSTERONE CYPIONATE 200 MG/ML
60 INJECTION, SOLUTION INTRAMUSCULAR WEEKLY
Qty: 4 ML | Refills: 1 | Status: CANCELLED | OUTPATIENT
Start: 2023-01-04

## 2023-01-04 NOTE — TELEPHONE ENCOUNTER
Central Prior Authorization Team   Phone: [phone]      PA Initiation    Medication: Testosterone  Insurance Company: [employer] - Phone [phone] Fax [phone]  Pharmacy Filling the Rx: [facility] -- [place], [place] - [address]  Filling Pharmacy Phone: [phone]  Filling Pharmacy Fax:    Start Date: 1/4/2023

## 2023-01-04 NOTE — TELEPHONE ENCOUNTER
Prior Authorization Approval    Authorization Effective Date: 12/5/2022  Authorization Expiration Date: 1/4/2024  Medication: Testosterone-APPROVED  Approved Dose/Quantity:   Reference #:     Insurance Company: [employer] - Phone [phone] Fax [phone]  Expected CoPay:       CoPay Card Available:      Foundation Assistance Needed:    Which Pharmacy is filling the prescription (Not needed for infusion/clinic administered): [facility] -- [place], [place] - [address]  Pharmacy Notified: Yes  Patient Notified: No    **Instructed pharmacy to notify patient when script is ready to /ship.**

## 2023-02-12 ENCOUNTER — HEALTH MAINTENANCE LETTER (OUTPATIENT)
Age: 28
End: 2023-02-12

## 2023-02-17 ENCOUNTER — TELEPHONE (OUTPATIENT)
Dept: DERMATOLOGY | Facility: CLINIC | Age: 28
End: 2023-02-17
Payer: COMMERCIAL

## 2023-02-20 ENCOUNTER — TELEPHONE (OUTPATIENT)
Dept: FAMILY MEDICINE | Facility: CLINIC | Age: 28
End: 2023-02-20
Payer: COMMERCIAL

## 2023-02-20 NOTE — TELEPHONE ENCOUNTER
"Prior Authorization Specialty Medication Request    Medication/Dose: needle, disp, 18G X 1\" MISC  ICD code (if different than what is on RX):    Previously Tried and Failed:      Important Lab Values:   Rationale:     Insurance Name: [facility]  Insurance ID: [id number]  Insurance Phone Number:     Pharmacy Information (if different than what is on RX)  Name:  [facility] -- [place], [place] - [address] [place] [address]. [address]  Phone:              "

## 2023-03-12 DIAGNOSIS — L70.0 ACNE VULGARIS: ICD-10-CM

## 2023-03-15 RX ORDER — TRETINOIN 0.5 MG/G
CREAM TOPICAL
Qty: 45 G | Refills: 0 | Status: SHIPPED | OUTPATIENT
Start: 2023-03-15 | End: 2024-04-19

## 2023-03-15 NOTE — TELEPHONE ENCOUNTER
LCV:11/23/2022  [facility] [place]  Derm process 1,   last clinic note- RTC 3 M  FYI to scheduling

## 2023-10-27 ENCOUNTER — OFFICE VISIT (OUTPATIENT)
Dept: FAMILY MEDICINE | Facility: CLINIC | Age: 28
End: 2023-10-27
Payer: COMMERCIAL

## 2023-10-27 VITALS
OXYGEN SATURATION: 99 % | BODY MASS INDEX: 24.96 KG/M2 | SYSTOLIC BLOOD PRESSURE: 108 MMHG | WEIGHT: 123.6 LBS | DIASTOLIC BLOOD PRESSURE: 69 MMHG | HEART RATE: 94 BPM

## 2023-10-27 DIAGNOSIS — F64.0 GENDER DYSPHORIA IN ADULT: Primary | ICD-10-CM

## 2023-10-27 DIAGNOSIS — F43.21 ADJUSTMENT DISORDER WITH DEPRESSED MOOD: ICD-10-CM

## 2023-10-27 DIAGNOSIS — D50.8 OTHER IRON DEFICIENCY ANEMIA: ICD-10-CM

## 2023-10-27 DIAGNOSIS — M54.2 NECK PAIN: ICD-10-CM

## 2023-10-27 PROBLEM — F64.9 GENDER DYSPHORIA: Status: ACTIVE | Noted: 2023-10-27

## 2023-10-27 LAB
ERYTHROCYTE [DISTWIDTH] IN BLOOD BY AUTOMATED COUNT: 12.5 % (ref 10–15)
FERRITIN SERPL-MCNC: 14 NG/ML (ref 6–409)
HCT VFR BLD AUTO: 38.7 % (ref 35–53)
HGB BLD-MCNC: 12.4 G/DL (ref 11.7–17.7)
IRON BINDING CAPACITY (ROCHE): 358 UG/DL (ref 240–430)
IRON SATN MFR SERPL: 15 % (ref 15–46)
IRON SERPL-MCNC: 52 UG/DL (ref 37–157)
MCH RBC QN AUTO: 27.4 PG (ref 26.5–33)
MCHC RBC AUTO-ENTMCNC: 32 G/DL (ref 31.5–36.5)
MCV RBC AUTO: 86 FL (ref 78–100)
PLATELET # BLD AUTO: 337 10E3/UL (ref 150–450)
RBC # BLD AUTO: 4.52 10E6/UL (ref 3.8–5.9)
WBC # BLD AUTO: 5.5 10E3/UL (ref 4–11)

## 2023-10-27 PROCEDURE — 83540 ASSAY OF IRON: CPT | Performed by: STUDENT IN AN ORGANIZED HEALTH CARE EDUCATION/TRAINING PROGRAM

## 2023-10-27 PROCEDURE — 85027 COMPLETE CBC AUTOMATED: CPT | Performed by: STUDENT IN AN ORGANIZED HEALTH CARE EDUCATION/TRAINING PROGRAM

## 2023-10-27 PROCEDURE — 83550 IRON BINDING TEST: CPT | Performed by: STUDENT IN AN ORGANIZED HEALTH CARE EDUCATION/TRAINING PROGRAM

## 2023-10-27 PROCEDURE — 82728 ASSAY OF FERRITIN: CPT | Performed by: STUDENT IN AN ORGANIZED HEALTH CARE EDUCATION/TRAINING PROGRAM

## 2023-10-27 PROCEDURE — 36415 COLL VENOUS BLD VENIPUNCTURE: CPT | Performed by: STUDENT IN AN ORGANIZED HEALTH CARE EDUCATION/TRAINING PROGRAM

## 2023-10-27 PROCEDURE — 99214 OFFICE O/P EST MOD 30 MIN: CPT | Mod: GC | Performed by: STUDENT IN AN ORGANIZED HEALTH CARE EDUCATION/TRAINING PROGRAM

## 2023-10-27 RX ORDER — DICLOFENAC SODIUM 75 MG/1
TABLET, DELAYED RELEASE ORAL
COMMUNITY
Start: 2023-04-30 | End: 2023-10-27

## 2023-10-27 RX ORDER — CETIRIZINE HYDROCHLORIDE 10 MG/1
10 TABLET ORAL AT BEDTIME
COMMUNITY
End: 2023-10-27

## 2023-10-27 RX ORDER — TESTOSTERONE CYPIONATE 200 MG/ML
60 INJECTION, SOLUTION INTRAMUSCULAR WEEKLY
Qty: 4 ML | Refills: 3 | Status: SHIPPED | OUTPATIENT
Start: 2023-10-27 | End: 2024-04-19

## 2023-10-27 RX ORDER — ESCITALOPRAM OXALATE 10 MG/1
10 TABLET ORAL DAILY
COMMUNITY
End: 2024-04-19

## 2023-10-27 RX ORDER — METHYLPHENIDATE HYDROCHLORIDE 27 MG/1
1 TABLET, EXTENDED RELEASE ORAL
COMMUNITY
Start: 2023-09-27

## 2023-10-27 RX ORDER — VALACYCLOVIR HYDROCHLORIDE 1 G/1
TABLET, FILM COATED ORAL
COMMUNITY
Start: 2023-01-26 | End: 2024-04-19

## 2023-10-27 ASSESSMENT — PATIENT HEALTH QUESTIONNAIRE - PHQ9: SUM OF ALL RESPONSES TO PHQ QUESTIONS 1-9: 1

## 2023-10-27 NOTE — PATIENT INSTRUCTIONS
It was great to see you today! Thanks for coming to \[facility]\""!      Here is the plan from today's visit    Start your testosterone again!  Come back in 3 months for a check in.       Thank you for coming to \[facility]\"" Clinic today.  Lab Testing:  **If you had lab testing today and your results are reassuring or normal they will be mailed to you or sent through [employer] within 7 days.   **If the lab tests need quick action we will call you with the results.  **If you are having labs done on a different day, please call [phone] to schedule at [facility] or [phone] for other [facility] locations. Labs do not offer walk-in appointments.  The phone number we will call with results is # [phone] (home) . If this is not the best number please call our clinic and change the number.    Medication Refills:  If you need any refills please call your pharmacy and they will contact us.   If you need to  your refill at a new pharmacy, please contact the new pharmacy directly. The new pharmacy will help you get your medications transferred faster.       Scheduling, Urgent Medical Concerns (24 hours a day!), or ultrasound [phone] (8-5:00 M-F)    Referrals: If a referral was made to an [facility] specialty provider and you do not get a call from central scheduling, please refer to directions on your visit summary or call our office during normal business hours for assistance.     If a Mammogram was ordered for you at the Breast Center call [phone] to schedule or change your appointment.  If you had an XRay/CT/Ultrasound/MRI ordered the number is [phone] to schedule or change your radiology appointment.       Blossom Ghosh, DO  Pronouns: she/her/hers  Resident Physician  [facility]/ \[facility]\"" Family Medicine Clinic    Department of Family Medicine and Community Health
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
## 2023-11-10 ENCOUNTER — THERAPY VISIT (OUTPATIENT)
Dept: PHYSICAL THERAPY | Facility: CLINIC | Age: 28
End: 2023-11-10
Attending: FAMILY MEDICINE
Payer: COMMERCIAL

## 2023-11-10 DIAGNOSIS — M54.2 NECK PAIN: ICD-10-CM

## 2023-11-10 DIAGNOSIS — M54.2 CHRONIC NECK PAIN: Primary | ICD-10-CM

## 2023-11-10 DIAGNOSIS — G89.29 CHRONIC NECK PAIN: Primary | ICD-10-CM

## 2023-11-10 PROCEDURE — 97140 MANUAL THERAPY 1/> REGIONS: CPT | Mod: GP | Performed by: PHYSICAL THERAPIST

## 2023-11-10 PROCEDURE — 97161 PT EVAL LOW COMPLEX 20 MIN: CPT | Mod: GP | Performed by: PHYSICAL THERAPIST

## 2023-11-10 PROCEDURE — 97110 THERAPEUTIC EXERCISES: CPT | Mod: GP | Performed by: PHYSICAL THERAPIST
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
## 2023-11-12 PROBLEM — M54.2 CHRONIC NECK PAIN: Status: ACTIVE | Noted: 2023-11-12

## 2023-11-12 PROBLEM — G89.29 CHRONIC NECK PAIN: Status: ACTIVE | Noted: 2023-11-12

## 2023-11-16 ENCOUNTER — MYC MEDICAL ADVICE (OUTPATIENT)
Dept: FAMILY MEDICINE | Facility: CLINIC | Age: 28
End: 2023-11-16
Payer: COMMERCIAL

## 2023-11-16 DIAGNOSIS — B00.9 CHRONIC MUCOCUTANEOUS INFECTION DUE TO HERPES SIMPLEX VIRUS (HSV): Primary | ICD-10-CM

## 2023-11-16 RX ORDER — VALACYCLOVIR HYDROCHLORIDE 500 MG/1
500 TABLET, FILM COATED ORAL 2 TIMES DAILY
Qty: 6 TABLET | Refills: 0 | Status: SHIPPED | OUTPATIENT
Start: 2023-11-16 | End: 2024-04-19

## 2024-02-06 PROBLEM — G89.29 CHRONIC NECK PAIN: Status: RESOLVED | Noted: 2023-11-12 | Resolved: 2024-02-06

## 2024-02-06 PROBLEM — M54.2 CHRONIC NECK PAIN: Status: RESOLVED | Noted: 2023-11-12 | Resolved: 2024-02-06

## 2024-02-06 NOTE — PROGRESS NOTES
DISCHARGE  Reason for Discharge: Patient has failed to schedule further appointments.    Equipment Issued: none    Discharge Plan: Patient to continue home program.    Referring Provider:  Dr. Janneth García DO       11/10/23 0500   Appointment Info   Signing clinician's name / credentials Saulo Altamirano DPT   Total/Authorized Visits 6 per PT plan of care   Visits Used 1   Medical Diagnosis Neck pain   PT Tx Diagnosis Chronic left sided neck pain without radicular pain   Quick Adds Certification   Progress Note/Certification   Start of Care Date 11/10/23   Onset of illness/injury or Date of Surgery 10/27/23   Therapy Frequency 1x daily per week   Predicted Duration 6 weeks   Certification date from 11/10/23   Certification date to 12/22/23   Progress Note Due Date 12/10/23   Progress Note Completed Date 11/10/23   PT Goal 1   Goal Identifier Driving   Goal Description the patient will be able to drive for 30 minutes without any neck pain due to limited cervical rotation   Rationale to maximize safety and independence with performance of ADLs and functional tasks;to maximize safety and independence with transportation   Goal Progress the patient currently notes 1/10 pain with driving for 30 minutes to work   Target Date 12/24/23   Subjective Report   Subjective Report See initial evaluation note   Treatment Interventions (PT)   Interventions Therapeutic Procedure/Exercise;Manual Therapy   Therapeutic Procedure/Exercise   PTRx Ther Proc 1 Upper Trapezius Stretch   PTRx Ther Proc 1 - Details 2x45s bilaterally   PTRx Ther Proc 2 Levator Scapulae Stretch - Peds   PTRx Ther Proc 2 - Details 2x45s bilaterally   PTRx Ther Proc 3 Upper Cervical/Deep Neck Flexor Strengthening   PTRx Ther Proc 3 - Details 2x8@3s holds   Therapeutic Procedures: strength, endurance, ROM, flexibillity minutes (63829) 9   Skilled Intervention initiation of home exercise routine, verbal and visual cuing given for proper execution of  exercises.   Patient Response/Progress the patient verbalizes understanding of exercise routine   Manual Therapy   Manual Therapy: Mobilization, MFR, MLD, friction massage minutes (11959) 14   Manual Therapy Manual Therapy 2   Manual Therapy 1 supine cervical paraspinal STM   Manual Therapy 1 - Details L cervical paraspinal, levator scapulae   Skilled Intervention hypertonicity noted L>R   Patient Response/Progress the patient notes improvement in neck pain following intervention   Manual Therapy 2 Supine manual cervical traction   Manual Therapy 2 - Details 4x60s   Eval/Assessments   PT Eval, Low Complexity Minutes (29979) 20   Plan   Home program QR code given for access to PTRX   Plan for next session [place] prior to postural exercises, cat/cow, cervical isometrics   Total Session Time   Timed Code Treatment Minutes 23   Total Treatment Time (sum of timed and untimed services) 43

## 2024-03-16 ENCOUNTER — HEALTH MAINTENANCE LETTER (OUTPATIENT)
Age: 29
End: 2024-03-16

## 2024-04-19 ENCOUNTER — OFFICE VISIT (OUTPATIENT)
Dept: FAMILY MEDICINE | Facility: CLINIC | Age: 29
End: 2024-04-19
Payer: COMMERCIAL

## 2024-04-19 VITALS
OXYGEN SATURATION: 100 % | DIASTOLIC BLOOD PRESSURE: 76 MMHG | BODY MASS INDEX: 24.64 KG/M2 | WEIGHT: 125.5 LBS | HEIGHT: 60 IN | SYSTOLIC BLOOD PRESSURE: 108 MMHG | HEART RATE: 80 BPM

## 2024-04-19 DIAGNOSIS — D50.9 IRON DEFICIENCY ANEMIA, UNSPECIFIED IRON DEFICIENCY ANEMIA TYPE: Primary | ICD-10-CM

## 2024-04-19 DIAGNOSIS — B00.9 CHRONIC MUCOCUTANEOUS INFECTION DUE TO HERPES SIMPLEX VIRUS (HSV): ICD-10-CM

## 2024-04-19 DIAGNOSIS — F33.41 RECURRENT MAJOR DEPRESSIVE DISORDER, IN PARTIAL REMISSION (H): ICD-10-CM

## 2024-04-19 DIAGNOSIS — F64.0 GENDER DYSPHORIA IN ADULT: ICD-10-CM

## 2024-04-19 PROCEDURE — 99214 OFFICE O/P EST MOD 30 MIN: CPT | Mod: GC | Performed by: STUDENT IN AN ORGANIZED HEALTH CARE EDUCATION/TRAINING PROGRAM

## 2024-04-19 PROCEDURE — 84403 ASSAY OF TOTAL TESTOSTERONE: CPT | Mod: KX | Performed by: STUDENT IN AN ORGANIZED HEALTH CARE EDUCATION/TRAINING PROGRAM

## 2024-04-19 PROCEDURE — 36415 COLL VENOUS BLD VENIPUNCTURE: CPT | Performed by: STUDENT IN AN ORGANIZED HEALTH CARE EDUCATION/TRAINING PROGRAM

## 2024-04-19 RX ORDER — VALACYCLOVIR HYDROCHLORIDE 1 G/1
2000 TABLET, FILM COATED ORAL 2 TIMES DAILY
Qty: 4 TABLET | Refills: 3 | Status: SHIPPED | OUTPATIENT
Start: 2024-04-19 | End: 2024-04-20

## 2024-04-19 RX ORDER — TESTOSTERONE CYPIONATE 200 MG/ML
60 INJECTION, SOLUTION INTRAMUSCULAR WEEKLY
Qty: 4 ML | Refills: 3 | Status: SHIPPED | OUTPATIENT
Start: 2024-04-19

## 2024-04-19 RX ORDER — VALACYCLOVIR HYDROCHLORIDE 500 MG/1
500 TABLET, FILM COATED ORAL 2 TIMES DAILY
Qty: 6 TABLET | Refills: 3 | Status: SHIPPED | OUTPATIENT
Start: 2024-04-19 | End: 2024-04-19

## 2024-04-19 NOTE — PROGRESS NOTES
Preceptor Attestation:    I discussed the patient with the resident and evaluated the patient in person. I have verified the content of the note, which accurately reflects my assessment of the patient and the plan of care.   Supervising Physician:  Alejandra Wong MD.

## 2024-04-19 NOTE — PATIENT INSTRUCTIONS
"Needles/syringes  - Allergy medical, [employer], or [employer]     What Is OMT (Osteopathic Manipulative Treatment)?    As part of their education, DOs (doctor of osteopathic medicine) receive special training in the musculoskeletal system (your nerves, bones and muscles).     OMT involves using the hands to diagnose, treat and prevent illness or injury.  Using OMT, your osteopathic physician will move your muscles and joints using techniques including stretching, gentle pressure and resistance.     OMT can help people of all ages and backgrounds. In addition to muscle or joint pain, it can be used to treat a variety of conditions such as asthma, sinus pain, migraines and carpal tunnel syndrome.     For more information, please visit [email or web address]    How to Schedule OMT :  Please make an appointment for \"OMT\" with the .  Please inform them you are scheduling for OMT with any DO provider. A list is provided below:     Janneth García, DO Rubén De Leon, DO  Ailin Michaels, DO  Subha Abrams, DO  Blossom Ghosh, DO  Jarret Clayton, DO  Humphrey Guthrie, DO Mery Jin, DO  Lupe Peguero, DO    Care after OMT:   Similar to other manual therapies, pain can sometimes be increased in the 24 hours after treatment. You can manage this at home by drinking plenty of fluids and using tylenol and/or ibuprofen as needed for pain.   Please return in 2-4 weeks or as directed by your physician today for further osteopathic evaluation.     "
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
## 2024-04-22 RX ORDER — ALBUTEROL SULFATE 90 UG/1
1-2 AEROSOL, METERED RESPIRATORY (INHALATION)
Status: CANCELLED
Start: 2024-04-29

## 2024-04-22 RX ORDER — ALBUTEROL SULFATE 0.83 MG/ML
2.5 SOLUTION RESPIRATORY (INHALATION)
Status: CANCELLED | OUTPATIENT
Start: 2024-04-29

## 2024-04-22 RX ORDER — EPINEPHRINE 1 MG/ML
0.3 INJECTION, SOLUTION, CONCENTRATE INTRAVENOUS EVERY 5 MIN PRN
Status: CANCELLED | OUTPATIENT
Start: 2024-04-29

## 2024-04-22 RX ORDER — HEPARIN SODIUM (PORCINE) LOCK FLUSH IV SOLN 100 UNIT/ML 100 UNIT/ML
5 SOLUTION INTRAVENOUS
Status: CANCELLED | OUTPATIENT
Start: 2024-04-29

## 2024-04-22 RX ORDER — DIPHENHYDRAMINE HYDROCHLORIDE 50 MG/ML
50 INJECTION INTRAMUSCULAR; INTRAVENOUS
Status: CANCELLED
Start: 2024-04-29

## 2024-04-22 RX ORDER — HEPARIN SODIUM,PORCINE 10 UNIT/ML
5-20 VIAL (ML) INTRAVENOUS DAILY PRN
Status: CANCELLED | OUTPATIENT
Start: 2024-04-29

## 2024-04-22 RX ORDER — METHYLPREDNISOLONE SODIUM SUCCINATE 125 MG/2ML
125 INJECTION, POWDER, LYOPHILIZED, FOR SOLUTION INTRAMUSCULAR; INTRAVENOUS
Status: CANCELLED
Start: 2024-04-29

## 2024-04-23 LAB — TESTOST SERPL-MCNC: 490 NG/DL (ref 8–950)

## 2024-05-10 ENCOUNTER — OFFICE VISIT (OUTPATIENT)
Dept: FAMILY MEDICINE | Facility: CLINIC | Age: 29
End: 2024-05-10
Payer: COMMERCIAL

## 2024-05-10 VITALS
OXYGEN SATURATION: 100 % | HEART RATE: 88 BPM | RESPIRATION RATE: 14 BRPM | BODY MASS INDEX: 23.85 KG/M2 | WEIGHT: 121.5 LBS | TEMPERATURE: 98.4 F | SYSTOLIC BLOOD PRESSURE: 98 MMHG | DIASTOLIC BLOOD PRESSURE: 67 MMHG | HEIGHT: 60 IN

## 2024-05-10 DIAGNOSIS — M99.04 SOMATIC DYSFUNCTION OF SACRAL REGION: ICD-10-CM

## 2024-05-10 DIAGNOSIS — Z12.4 CERVICAL CANCER SCREENING: Primary | ICD-10-CM

## 2024-05-10 DIAGNOSIS — D50.9 IRON DEFICIENCY ANEMIA, UNSPECIFIED IRON DEFICIENCY ANEMIA TYPE: ICD-10-CM

## 2024-05-10 DIAGNOSIS — M99.03 SOMATIC DYSFUNCTION OF SPINE, LUMBAR: ICD-10-CM

## 2024-05-10 DIAGNOSIS — G57.01 PIRIFORMIS SYNDROME, RIGHT: ICD-10-CM

## 2024-05-10 LAB — HGB BLD-MCNC: 11.4 G/DL (ref 11.7–17.7)

## 2024-05-10 PROCEDURE — 99213 OFFICE O/P EST LOW 20 MIN: CPT | Mod: 25 | Performed by: STUDENT IN AN ORGANIZED HEALTH CARE EDUCATION/TRAINING PROGRAM

## 2024-05-10 PROCEDURE — 82728 ASSAY OF FERRITIN: CPT | Performed by: STUDENT IN AN ORGANIZED HEALTH CARE EDUCATION/TRAINING PROGRAM

## 2024-05-10 PROCEDURE — 98925 OSTEOPATH MANJ 1-2 REGIONS: CPT | Mod: GC | Performed by: STUDENT IN AN ORGANIZED HEALTH CARE EDUCATION/TRAINING PROGRAM

## 2024-05-10 PROCEDURE — 83550 IRON BINDING TEST: CPT | Performed by: STUDENT IN AN ORGANIZED HEALTH CARE EDUCATION/TRAINING PROGRAM

## 2024-05-10 PROCEDURE — 85018 HEMOGLOBIN: CPT | Performed by: STUDENT IN AN ORGANIZED HEALTH CARE EDUCATION/TRAINING PROGRAM

## 2024-05-10 PROCEDURE — 83540 ASSAY OF IRON: CPT | Performed by: STUDENT IN AN ORGANIZED HEALTH CARE EDUCATION/TRAINING PROGRAM

## 2024-05-10 PROCEDURE — 36415 COLL VENOUS BLD VENIPUNCTURE: CPT | Performed by: STUDENT IN AN ORGANIZED HEALTH CARE EDUCATION/TRAINING PROGRAM

## 2024-05-10 ASSESSMENT — PATIENT HEALTH QUESTIONNAIRE - PHQ9
10. IF YOU CHECKED OFF ANY PROBLEMS, HOW DIFFICULT HAVE THESE PROBLEMS MADE IT FOR YOU TO DO YOUR WORK, TAKE CARE OF THINGS AT HOME, OR GET ALONG WITH OTHER PEOPLE: SOMEWHAT DIFFICULT
SUM OF ALL RESPONSES TO PHQ QUESTIONS 1-9: 4
SUM OF ALL RESPONSES TO PHQ QUESTIONS 1-9: 4

## 2024-05-10 NOTE — PATIENT INSTRUCTIONS
It was great to see you today! Thanks for coming to [facility]!      Here is the plan from today's visit    Try exercises for hip. ONLY Right side for 1 week. Then both sides equally.       Thank you for coming to [facility] today.  Lab Testing:  **If you had lab testing today and your results are reassuring or normal they will be mailed to you or sent through [employer] within 7 days.   **If the lab tests need quick action we will call you with the results.  **If you are having labs done on a different day, please call [phone] to schedule at [facility] or [phone] for other [facility]patient Lab locations. Labs do not offer walk-in appointments.  The phone number we will call with results is # [phone] (home) . If this is not the best number please call our clinic and change the number.    Medication Refills:  If you need any refills please call your pharmacy and they will contact us.   If you need to  your refill at a new pharmacy, please contact the new pharmacy directly. The new pharmacy will help you get your medications transferred faster.       Scheduling, Urgent Medical Concerns (24 hours a day!), or ultrasound [phone] (8-5:00 M-F)    Referrals: If a referral was made to an [facility] specialty provider and you do not get a call from central scheduling, please refer to directions on your visit summary or call our office during normal business hours for assistance.     If a Mammogram was ordered for you at the Breast Center call [phone] to schedule or change your appointment.  If you had an XRay/CT/Ultrasound/MRI ordered the number is [phone] to schedule or change your radiology appointment.       Blossom Ghosh, DO  Pronouns: she/her/hers  Resident Physician  [facility]    Department of Family Medicine and Community [facility]
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
## 2024-05-11 LAB
FERRITIN SERPL-MCNC: 13 NG/ML (ref 6–409)
IRON BINDING CAPACITY (ROCHE): 337 UG/DL (ref 240–430)
IRON SATN MFR SERPL: 25 % (ref 15–46)
IRON SERPL-MCNC: 84 UG/DL (ref 37–157)
TRANSFERRIN SERPL-MCNC: 347 MG/DL (ref 200–360)

## 2024-05-17 ENCOUNTER — INFUSION THERAPY VISIT (OUTPATIENT)
Dept: INFUSION THERAPY | Facility: CLINIC | Age: 29
End: 2024-05-17
Attending: EMERGENCY MEDICINE
Payer: COMMERCIAL

## 2024-05-17 VITALS
HEART RATE: 59 BPM | TEMPERATURE: 97.8 F | OXYGEN SATURATION: 98 % | DIASTOLIC BLOOD PRESSURE: 59 MMHG | RESPIRATION RATE: 15 BRPM | SYSTOLIC BLOOD PRESSURE: 96 MMHG

## 2024-05-17 DIAGNOSIS — D50.9 IRON DEFICIENCY ANEMIA, UNSPECIFIED IRON DEFICIENCY ANEMIA TYPE: Primary | ICD-10-CM

## 2024-05-17 DIAGNOSIS — F33.41 RECURRENT MAJOR DEPRESSIVE DISORDER, IN PARTIAL REMISSION (H): ICD-10-CM

## 2024-05-17 PROCEDURE — 250N000011 HC RX IP 250 OP 636: Performed by: STUDENT IN AN ORGANIZED HEALTH CARE EDUCATION/TRAINING PROGRAM

## 2024-05-17 PROCEDURE — 96365 THER/PROPH/DIAG IV INF INIT: CPT

## 2024-05-17 PROCEDURE — 96366 THER/PROPH/DIAG IV INF ADDON: CPT

## 2024-05-17 PROCEDURE — 258N000003 HC RX IP 258 OP 636: Performed by: STUDENT IN AN ORGANIZED HEALTH CARE EDUCATION/TRAINING PROGRAM

## 2024-05-17 RX ORDER — ALBUTEROL SULFATE 0.83 MG/ML
2.5 SOLUTION RESPIRATORY (INHALATION)
Status: CANCELLED | OUTPATIENT
Start: 2024-05-19

## 2024-05-17 RX ORDER — EPINEPHRINE 1 MG/ML
0.3 INJECTION, SOLUTION INTRAMUSCULAR; SUBCUTANEOUS EVERY 5 MIN PRN
Status: CANCELLED | OUTPATIENT
Start: 2024-05-19

## 2024-05-17 RX ORDER — ALBUTEROL SULFATE 90 UG/1
1-2 AEROSOL, METERED RESPIRATORY (INHALATION)
Status: CANCELLED
Start: 2024-05-19

## 2024-05-17 RX ORDER — DIPHENHYDRAMINE HYDROCHLORIDE 50 MG/ML
50 INJECTION INTRAMUSCULAR; INTRAVENOUS
Status: CANCELLED
Start: 2024-05-19

## 2024-05-17 RX ORDER — METHYLPREDNISOLONE SODIUM SUCCINATE 125 MG/2ML
125 INJECTION, POWDER, LYOPHILIZED, FOR SOLUTION INTRAMUSCULAR; INTRAVENOUS
Status: CANCELLED
Start: 2024-05-19

## 2024-05-17 RX ORDER — HEPARIN SODIUM,PORCINE 10 UNIT/ML
5-20 VIAL (ML) INTRAVENOUS DAILY PRN
Status: CANCELLED | OUTPATIENT
Start: 2024-05-19

## 2024-05-17 RX ORDER — HEPARIN SODIUM (PORCINE) LOCK FLUSH IV SOLN 100 UNIT/ML 100 UNIT/ML
5 SOLUTION INTRAVENOUS
Status: CANCELLED | OUTPATIENT
Start: 2024-05-19

## 2024-05-17 RX ADMIN — IRON SUCROSE 300 MG: 20 INJECTION, SOLUTION INTRAVENOUS at 10:45

## 2024-05-17 ASSESSMENT — PAIN SCALES - GENERAL: PAINLEVEL: NO PAIN (0)

## 2024-05-17 NOTE — PATIENT INSTRUCTIONS
Dear Duglas Hurtado    Thank you for choosing [facility] ([facility]) for your infusion.  The following information is a summary of our appointment as well as important reminders.      We look forward in seeing you on your next appointment here at [facility] ([facility]).  Please don t hesitate to call us at [phone] to reschedule any of your appointments or to speak with one of the [facility] registered nurses.  It was a pleasure taking care of you today.    Sincerely,    [facility]  [facility]  [address] [place] [address]  [address]  Phone:  [phone]

## 2024-05-17 NOTE — PROGRESS NOTES
Infusion Nursing Note:  Duglas Hurtado presents today for Venofer.    Patient seen by provider today: No   present during visit today: Not Applicable.    Note: N/A.    Intravenous Access:  Peripheral IV placed.    Treatment Conditions:  Not Applicable.    Post Infusion Assessment:  Patient tolerated infusion without incident.  Site patent and intact, free from redness, edema or discomfort.  Access discontinued per protocol.     Discharge Plan:   Patient and/or family verbalized understanding of discharge instructions and all questions answered.  Copy of AVS reviewed with patient and/or family.  Patient will return next week for next appointment.  Patient discharged in stable condition accompanied by: self.    Administrations This Visit       iron sucrose (VENOFER) 300 mg in sodium chloride 0.9 % 290 mL intermittent infusion       Admin Date  05/17/2024 Action  $New Bag Dose  300 mg Rate  193.3 mL/hr Route  Intravenous Documented By  Melissa Beckman RN                  /67 (BP Location: Right arm, Patient Position: Semi-Dacosta's, Cuff Size: Adult Regular)   Pulse 63   Temp 97.8  F (36.6  C) (Oral)   Resp 15   LMP  (LMP Unknown)   SpO2 98%     Melissa Beckman RN

## 2024-05-31 ENCOUNTER — INFUSION THERAPY VISIT (OUTPATIENT)
Dept: INFUSION THERAPY | Facility: CLINIC | Age: 29
End: 2024-05-31
Attending: EMERGENCY MEDICINE
Payer: COMMERCIAL

## 2024-05-31 VITALS
TEMPERATURE: 98.3 F | SYSTOLIC BLOOD PRESSURE: 101 MMHG | OXYGEN SATURATION: 97 % | HEART RATE: 70 BPM | DIASTOLIC BLOOD PRESSURE: 69 MMHG

## 2024-05-31 DIAGNOSIS — F33.41 RECURRENT MAJOR DEPRESSIVE DISORDER, IN PARTIAL REMISSION (H): ICD-10-CM

## 2024-05-31 DIAGNOSIS — D50.9 IRON DEFICIENCY ANEMIA, UNSPECIFIED IRON DEFICIENCY ANEMIA TYPE: Primary | ICD-10-CM

## 2024-05-31 PROCEDURE — 96365 THER/PROPH/DIAG IV INF INIT: CPT

## 2024-05-31 PROCEDURE — 250N000011 HC RX IP 250 OP 636: Performed by: STUDENT IN AN ORGANIZED HEALTH CARE EDUCATION/TRAINING PROGRAM

## 2024-05-31 PROCEDURE — 258N000003 HC RX IP 258 OP 636: Performed by: STUDENT IN AN ORGANIZED HEALTH CARE EDUCATION/TRAINING PROGRAM

## 2024-05-31 PROCEDURE — 96366 THER/PROPH/DIAG IV INF ADDON: CPT

## 2024-05-31 RX ORDER — ALBUTEROL SULFATE 90 UG/1
1-2 AEROSOL, METERED RESPIRATORY (INHALATION)
Start: 2024-06-02

## 2024-05-31 RX ORDER — HEPARIN SODIUM,PORCINE 10 UNIT/ML
5-20 VIAL (ML) INTRAVENOUS DAILY PRN
OUTPATIENT
Start: 2024-06-02

## 2024-05-31 RX ORDER — ALBUTEROL SULFATE 0.83 MG/ML
2.5 SOLUTION RESPIRATORY (INHALATION)
OUTPATIENT
Start: 2024-06-02

## 2024-05-31 RX ORDER — HEPARIN SODIUM (PORCINE) LOCK FLUSH IV SOLN 100 UNIT/ML 100 UNIT/ML
5 SOLUTION INTRAVENOUS
OUTPATIENT
Start: 2024-06-02

## 2024-05-31 RX ORDER — DIPHENHYDRAMINE HYDROCHLORIDE 50 MG/ML
50 INJECTION INTRAMUSCULAR; INTRAVENOUS
Start: 2024-06-02

## 2024-05-31 RX ORDER — EPINEPHRINE 1 MG/ML
0.3 INJECTION, SOLUTION INTRAMUSCULAR; SUBCUTANEOUS EVERY 5 MIN PRN
OUTPATIENT
Start: 2024-06-02

## 2024-05-31 RX ORDER — METHYLPREDNISOLONE SODIUM SUCCINATE 125 MG/2ML
125 INJECTION, POWDER, LYOPHILIZED, FOR SOLUTION INTRAMUSCULAR; INTRAVENOUS
Start: 2024-06-02

## 2024-05-31 RX ADMIN — IRON SUCROSE 300 MG: 20 INJECTION, SOLUTION INTRAVENOUS at 11:28

## 2024-05-31 NOTE — PROGRESS NOTES
Infusion Nursing Note:  Duglas Hurtado presents today for Venofer.    Patient seen by provider today: No   present during visit today: Not Applicable.    Note: N/A.    Intravenous Access:  Peripheral IV placed.    Treatment Conditions:  Not Applicable.    Post Infusion Assessment:  Patient tolerated infusion without incident.  Site patent and intact, free from redness, edema or discomfort.  Access discontinued per protocol.     Discharge Plan:   Patient and/or family verbalized understanding of discharge instructions and all questions answered.  Copy of AVS reviewed with patient and/or family.  Patient will return next week for next appointment.  Patient discharged in stable condition accompanied by: self.    Administrations This Visit       iron sucrose (VENOFER) 300 mg in sodium chloride 0.9 % 290 mL intermittent infusion       Admin Date  05/31/2024 Action  $New Bag Dose  300 mg Rate  193.3 mL/hr Route  Intravenous Documented By  Lida León RN                  /68   Pulse 66   Temp 98.3  F (36.8  C) (Oral)   LMP  (LMP Unknown)   SpO2 97%     Lida León RN on 5/31/2024 at 11:36 AM

## 2024-05-31 NOTE — PATIENT INSTRUCTIONS
Dear Duglas Hurtado    Thank you for choosing [facility] ([facility]) for your infusion.  The following information is a summary of our appointment as well as important reminders.      If you are a transplant patient and require transplant education, please click on this link: [email or web address].    We look forward in seeing you on your next appointment here at [facility] ([facility]).  Please don t hesitate to call us at [phone] to reschedule any of your appointments or to speak with one of the [facility] registered nurses.  It was a pleasure taking care of you today.    Sincerely,    [facility]  [facility]  [address] [place] [address]  [address]  Phone:  [phone]

## 2025-02-07 ENCOUNTER — OFFICE VISIT (OUTPATIENT)
Dept: FAMILY MEDICINE | Facility: CLINIC | Age: 30
End: 2025-02-07
Payer: COMMERCIAL

## 2025-02-07 VITALS
WEIGHT: 126 LBS | OXYGEN SATURATION: 99 % | RESPIRATION RATE: 15 BRPM | HEIGHT: 60 IN | SYSTOLIC BLOOD PRESSURE: 99 MMHG | BODY MASS INDEX: 24.74 KG/M2 | TEMPERATURE: 97.8 F | HEART RATE: 75 BPM | DIASTOLIC BLOOD PRESSURE: 70 MMHG

## 2025-02-07 DIAGNOSIS — B00.9 CHRONIC MUCOCUTANEOUS INFECTION DUE TO HERPES SIMPLEX VIRUS (HSV): ICD-10-CM

## 2025-02-07 DIAGNOSIS — D50.9 IRON DEFICIENCY ANEMIA, UNSPECIFIED IRON DEFICIENCY ANEMIA TYPE: ICD-10-CM

## 2025-02-07 DIAGNOSIS — F64.0 GENDER DYSPHORIA IN ADULT: Primary | ICD-10-CM

## 2025-02-07 DIAGNOSIS — L30.9 ECZEMA, UNSPECIFIED TYPE: ICD-10-CM

## 2025-02-07 DIAGNOSIS — R53.83 OTHER FATIGUE: ICD-10-CM

## 2025-02-07 LAB
ERYTHROCYTE [DISTWIDTH] IN BLOOD BY AUTOMATED COUNT: 11.7 % (ref 10–15)
FERRITIN SERPL-MCNC: 49 NG/ML (ref 6–409)
HCT VFR BLD AUTO: 41.7 % (ref 35–53)
HGB BLD-MCNC: 13.8 G/DL (ref 11.7–17.7)
IRON BINDING CAPACITY (ROCHE): 308 UG/DL (ref 240–430)
IRON SATN MFR SERPL: 33 % (ref 15–46)
IRON SERPL-MCNC: 102 UG/DL (ref 37–157)
MCH RBC QN AUTO: 29.4 PG (ref 26.5–33)
MCHC RBC AUTO-ENTMCNC: 33.1 G/DL (ref 31.5–36.5)
MCV RBC AUTO: 89 FL (ref 78–100)
PLATELET # BLD AUTO: 302 10E3/UL (ref 150–450)
RBC # BLD AUTO: 4.7 10E6/UL (ref 3.8–5.9)
TRANSFERRIN SERPL-MCNC: 258 MG/DL (ref 200–360)
TSH SERPL DL<=0.005 MIU/L-ACNC: 1.65 UIU/ML (ref 0.3–4.2)
WBC # BLD AUTO: 6.4 10E3/UL (ref 4–11)

## 2025-02-07 PROCEDURE — 85027 COMPLETE CBC AUTOMATED: CPT | Performed by: STUDENT IN AN ORGANIZED HEALTH CARE EDUCATION/TRAINING PROGRAM

## 2025-02-07 PROCEDURE — 83540 ASSAY OF IRON: CPT | Performed by: STUDENT IN AN ORGANIZED HEALTH CARE EDUCATION/TRAINING PROGRAM

## 2025-02-07 PROCEDURE — 84466 ASSAY OF TRANSFERRIN: CPT | Performed by: STUDENT IN AN ORGANIZED HEALTH CARE EDUCATION/TRAINING PROGRAM

## 2025-02-07 PROCEDURE — 82728 ASSAY OF FERRITIN: CPT | Performed by: STUDENT IN AN ORGANIZED HEALTH CARE EDUCATION/TRAINING PROGRAM

## 2025-02-07 PROCEDURE — 36415 COLL VENOUS BLD VENIPUNCTURE: CPT | Performed by: STUDENT IN AN ORGANIZED HEALTH CARE EDUCATION/TRAINING PROGRAM

## 2025-02-07 PROCEDURE — 84443 ASSAY THYROID STIM HORMONE: CPT | Performed by: STUDENT IN AN ORGANIZED HEALTH CARE EDUCATION/TRAINING PROGRAM

## 2025-02-07 PROCEDURE — 99214 OFFICE O/P EST MOD 30 MIN: CPT | Performed by: STUDENT IN AN ORGANIZED HEALTH CARE EDUCATION/TRAINING PROGRAM

## 2025-02-07 RX ORDER — VALACYCLOVIR HYDROCHLORIDE 1 G/1
2000 TABLET, FILM COATED ORAL 2 TIMES DAILY
Qty: 4 TABLET | Refills: 4 | Status: SHIPPED | OUTPATIENT
Start: 2025-02-07

## 2025-02-07 ASSESSMENT — PAIN SCALES - GENERAL: PAINLEVEL_OUTOF10: MILD PAIN (1)

## 2025-02-07 NOTE — PROGRESS NOTES
"  Assessment & Plan     Gender dysphoria in adult  Discussed some options around trying to be more c/w testosterone dosing. Could trial q2w but discussed that often there is more of a high and low r/t higher dosing. Pt will work on looking into mail order options for ease of access and I will write to see if pharmacy will send 3 mo supply.   - testosterone cypionate (DEPOTESTOSTERONE) 200 MG/ML injection  Dispense: 12 mL; Refill: 4    Chronic mucocutaneous infection due to herpes simplex virus (HSV)  Episodic therapy   - valACYclovir (VALTREX) 1000 mg tablet  Dispense: 4 tablet; Refill: 4    Iron deficiency anemia, unspecified iron deficiency anemia type  S/p iron infusions x2. Longstanding dx, unclear what degree of workup has been done. Now amenorrheic on T. Would consider H.pylori, celiac testing in the future if recurrent.   - CBC with platelets  - Iron & Iron Binding Capacity  - Transferrin  - Ferritin  - CBC with platelets  - Iron & Iron Binding Capacity  - Transferrin  - Ferritin    Other fatigue  - TSH with free T4 reflex  - TSH with free T4 reflex    Eczema, unspecified type  - triamcinolone (KENALOG) 0.1 % external ointment  Dispense: 30 g; Refill: 1        BMI  Estimated body mass index is 25.45 kg/m  as calculated from the following:    Height as of this encounter: 1.499 m (4' 11\").    Weight as of this encounter: 57.2 kg (126 lb).         No follow-ups on file.    Lukas Ardon is a 29 year old, presenting for the following health issues:  Follow Up (HRT Follow-up) and Recheck Medication      2/7/2025    11:00 AM   Additional Questions   Roomed by Srikanth   Accompanied by Self         2/7/2025    Information    services provided? No     HPI       Any special concerns today?  Was kind of toying around with going on testtosterone every other week. Had trouble w/ remembering to do it bc tries to do it on Sundays. If he takes it too late in the day, has trouble sleeping. Then next " "day doesn't do it.     In a 3 month period - missing maybe half the weeks.     Didn't like the gel            Objective    BP 99/70 (BP Location: Left arm, Patient Position: Sitting, Cuff Size: Adult Regular)   Pulse 75   Temp 97.8  F (36.6  C) (Temporal)   Resp 15   Ht 1.499 m (4' 11\")   Wt 57.2 kg (126 lb)   LMP 01/07/2025 (Approximate)   SpO2 99%   BMI 25.45 kg/m    Body mass index is 25.45 kg/m .  Physical Exam   GENERAL: alert, cooperative, in no acute distress  HEENT: sclera clear  PULM: normal respiratory effort   NEURO: alert and oriented, grossly intact, moves all extremities, normal gait   SKIN: no rashes or lesions visualized   PSYCH: euthymic affect          Signed Electronically by: Subha Abrams DO    "

## 2025-02-07 NOTE — PATIENT INSTRUCTIONS
Try calling your prescription insurance to find out what mail order pharmacy they cover. Otherwise, [facility] has a Mail-Order pharmacy if your prescription coverage will pay for you to fill with [facility].

## 2025-02-16 RX ORDER — TESTOSTERONE CYPIONATE 200 MG/ML
60 INJECTION, SOLUTION INTRAMUSCULAR WEEKLY
Qty: 12 ML | Refills: 4 | Status: SHIPPED | OUTPATIENT
Start: 2025-02-16

## 2025-02-16 RX ORDER — TRIAMCINOLONE ACETONIDE 1 MG/G
OINTMENT TOPICAL 2 TIMES DAILY
Qty: 30 G | Refills: 1 | Status: SHIPPED | OUTPATIENT
Start: 2025-02-16

## 2025-03-22 ENCOUNTER — HEALTH MAINTENANCE LETTER (OUTPATIENT)
Age: 30
End: 2025-03-22

## 2025-07-17 ENCOUNTER — MYC REFILL (OUTPATIENT)
Dept: FAMILY MEDICINE | Facility: CLINIC | Age: 30
End: 2025-07-17
Payer: COMMERCIAL

## 2025-07-17 ENCOUNTER — OFFICE VISIT (OUTPATIENT)
Dept: FAMILY MEDICINE | Facility: CLINIC | Age: 30
End: 2025-07-17
Payer: OTHER MISCELLANEOUS

## 2025-07-17 VITALS
TEMPERATURE: 98.2 F | SYSTOLIC BLOOD PRESSURE: 106 MMHG | DIASTOLIC BLOOD PRESSURE: 74 MMHG | BODY MASS INDEX: 27.27 KG/M2 | OXYGEN SATURATION: 99 % | HEART RATE: 67 BPM | RESPIRATION RATE: 14 BRPM | HEIGHT: 60 IN | WEIGHT: 138.9 LBS

## 2025-07-17 DIAGNOSIS — Z01.818 PREOP GENERAL PHYSICAL EXAM: Primary | ICD-10-CM

## 2025-07-17 DIAGNOSIS — B00.9 CHRONIC MUCOCUTANEOUS INFECTION DUE TO HERPES SIMPLEX VIRUS (HSV): ICD-10-CM

## 2025-07-17 DIAGNOSIS — R42 ORTHOSTATIC LIGHTHEADEDNESS: ICD-10-CM

## 2025-07-17 DIAGNOSIS — R94.01 ABNORMAL EEG: ICD-10-CM

## 2025-07-17 DIAGNOSIS — F64.9 GENDER DYSPHORIA: ICD-10-CM

## 2025-07-17 DIAGNOSIS — F90.9 ATTENTION DEFICIT HYPERACTIVITY DISORDER (ADHD), UNSPECIFIED ADHD TYPE: ICD-10-CM

## 2025-07-17 DIAGNOSIS — S63.092D SUBLUXATION OF LEFT EXTENSOR CARPI ULNARIS TENDON, SUBSEQUENT ENCOUNTER: ICD-10-CM

## 2025-07-17 DIAGNOSIS — D50.8 OTHER IRON DEFICIENCY ANEMIA: ICD-10-CM

## 2025-07-17 NOTE — PROGRESS NOTES
Preceptor Attestation:  Patient seen and evaluated in person. I discussed the patient with the resident. I have verified the content of the note, which accurately reflects my assessment of the patient and the plan of care.   Supervising Physician:  Subha Abrams DO

## 2025-07-17 NOTE — PATIENT INSTRUCTIONS
Patient Education   Here is the plan from today's visit    1. Other iron deficiency anemia (Primary)  Your do not have any signs of new bleeding to warrant new testing because this was normal levels last time in 2/2025    2. 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     3. Gender dysphoria  4. Attention deficit hyperactivity disorder (ADHD), unspecified ADHD type  We discussed medications to hold.     5. Preop general physical exam  You have our approval to go forward with surgery. Please see the medication hold times and use the surgeons advice over ours in case there is any conflicting information.     6. For your lightheadedness, I will check your vitals today. You may have Pots. In general, for pots we can give a referral to discuss this with a cardiologist. The treatment is still compression stockings, sometimes binders for abdomen and salt tabs.       Please call or return to clinic if your symptoms don't go away.    Follow up plan  Return if symptoms worsen or fail to improve.    Thank you for coming to [facility] today.  Lab Testing:  **If you had lab testing today and your results are reassuring or normal they will be mailed to you or sent through [employer] within 7 days.   **If the lab tests need quick action we will call you with the results.  **If you are having labs done on a different day, please call [phone] to schedule at [facility] or [phone] for other [facility] locations. Labs do not offer walk-in appointments.  The phone number we will call with results is # [phone] (home) . If this is not the best number please call our clinic and change the number.  Medication Refills:  If you need any refills please call your pharmacy and they will contact us.   If you need to  your refill at a new pharmacy, please contact the new pharmacy directly. The new pharmacy will help you get your medications  transferred faster.   Scheduling:  If you have any concerns about today's visit or wish to schedule another appointment please call our office during normal business hours [phone] (8-5:00 M-F). If you can no longer make a scheduled visit, please cancel via [employer] or call us to cancel.   If a referral was made to an [facility] specialty provider and you do not get a call from central scheduling, please refer to directions on your visit summary or call our office during normal business hours for assistance.   If a Mammogram was ordered for you at the Breast Center call [phone] to schedule or change your appointment.  If you had an XRay/CT/Ultrasound/MRI ordered the number is [phone] to schedule or change your radiology appointment.   [facility] has limited ultrasound appointments available on Wednesdays, if you would like your ultrasound at [facility], please call [phone] to schedule.   Medical Concerns:  If you have urgent medical concerns please call [phone] at any time of the day.    Gino Roberto MD             How to Take Your Medication Before Surgery  Preoperative Medication Instructions   c       Patient Education   Preparing for Your Surgery  For Adults  Getting started  In most cases, a nurse will call to review your health history and instructions. They will give you an arrival time based on your scheduled surgery time. Please be ready to share:  Your doctor's clinic name and phone number  Your medical, surgical, and anesthesia history  A list of allergies and sensitivities  A list of medicines, including herbal treatments and over-the-counter drugs  Whether the patient has a legal guardian (ask how to send us the papers in advance)  Note: You may not receive a call if you were seen at our PAC (Preoperative Assessment Center).  Please tell us if you're pregnant--or if there's any chance you might be pregnant. Some surgeries may injure a fetus (unborn baby), so they  require a pregnancy test. Surgeries that are safe for a fetus don't always need a test, and you can choose whether to have one.   Preparing for surgery  Within 10 to 30 days of surgery: Have a pre-op exam (sometimes called an H&P, or History and Physical). This can be done at a clinic or pre-operative center.  If you're having a , you may not need this exam. Talk to your care team.  At your pre-op exam, talk to your care team about all medicines you take. (This includes CBD oil and any drugs, such as THC, marijuana, and other forms of cannabis.) If you need to stop any medicine before surgery, ask when to start taking it again.  This is for your safety. Many medicines and drugs can make you bleed too much during surgery. Some change how well surgery (anesthesia) drugs work.  Call your insurance company to let them know you're having surgery. (If you don't have insurance, call [phone].)  Call your clinic if there's any change in your health. This includes a scrape or scratch near the surgery site, or any signs of a cold (sore throat, runny nose, cough, rash, fever).  Eating and drinking guidelines  For your safety: Unless your surgeon tells you otherwise, follow the guidelines below.  Eat and drink as normal until 8 hours before you arrive for surgery. After that, no food or milk. You can spit out gum when you arrive.  Drink clear liquids until 2 hours before you arrive. These are liquids you can see through, like water, Gatorade, and Propel Water. They also include plain black coffee and tea (no cream or milk).  No alcohol for 24 hours before you arrive. The night before surgery, stop any drinks that contain THC.  If your care team tells you to take medicine on the morning of surgery, it's okay to take it with a sip of water. No other medicines or drugs are allowed (including CBD oil)--follow your care team's instructions.  If you have questions the day of surgery, call your hospital or surgery center.    Preventing infection  Shower or bathe the night before and the morning of surgery. Follow the instructions your clinic gave you. (If no instructions, use regular soap.)  Don't shave or clip hair near your surgery site. We'll remove the hair if needed.  Don't smoke or vape the morning of surgery. No chewing tobacco for 6 hours before you arrive. A nicotine patch is okay. You may spit out nicotine gum when you arrive.  For some surgeries, the surgeon will tell you to fully quit smoking and nicotine.  We will make every effort to keep you safe from infection. We will:  Clean our hands often with soap and water (or an alcohol-based hand rub).  Clean the skin at your surgery site with a special soap that kills germs.  Give you a special gown to keep you warm. (Cold raises the risk of infection.)  Wear hair covers, masks, gowns, and gloves during surgery.  Give antibiotic medicine, if prescribed. Not all surgeries need this medicine.  What to bring on the day of surgery  Photo ID and insurance card  Copy of your health care directive, if you have one  Glasses and hearing aids (bring cases)  You can't wear contacts during surgery  Inhaler and eye drops, if you use them (tell us about these when you arrive)  CPAP machine or breathing device, if you use them  A few personal items, if spending the night  If you have . . .  A pacemaker, ICD (cardiac defibrillator), or other implant: Bring the ID card.  An implanted stimulator: Bring the remote control.  A legal guardian: Bring a copy of the certified (court-stamped) guardianship papers.  Please remove any jewelry, including body piercings. Leave jewelry and other valuables at home.  If you're going home the day of surgery  You must have a support person drive you home. They should stay with you overnight, and they may need to help with your self-care.  If you don't have a support person, please tells us as soon as possible. We can help.  After surgery  If it's hard to  control your pain or you need more pain medicine, please call your surgeon's office.  Questions?   If you have any questions for your care team, list them here:   ____________________________________________________________________________________________________________________________________________________________________________________________________________________________________________________________  For informational purposes only. Not to replace the advice of your health care provider. Copyright   2003, 2019 [facility]. All rights reserved. Clinically reviewed by Dno De Oliveira MD. [employer] 635995 - REV 02/25.

## 2025-07-18 NOTE — TELEPHONE ENCOUNTER
"Request for medication refill:    Medication Name: valACYclovir (VALTREX) 1000 mg tablet       Patient comment: Can you give me a longer refill     Providers if patient needs an appointment and you are willing to give a one month supply please refill for one month and  send a MyChart using \".SMILLIMITEDREFILL\" .Or route chart to \"[facility] \" . And use the phrase \" SMIRXFOLLOWUP\"To call patient and inform of limited refill and providers request to make an appointment. (Giving one month refill in non controlled medications is strongly recommended before denial)    If refill has been denied, meaning absolutely no refills without visit, please complete the smart phrase \".SMIRXREFUSE\" and route it to the \"[facility] MED REFILLS\"  pool to inform the patient and the pharmacy.    Guanakito Shafer, CMA      "

## 2025-07-19 RX ORDER — VALACYCLOVIR HYDROCHLORIDE 1 G/1
2000 TABLET, FILM COATED ORAL 2 TIMES DAILY
Qty: 4 TABLET | Refills: 4 | Status: SHIPPED | OUTPATIENT
Start: 2025-07-19